# Patient Record
Sex: MALE | Race: WHITE | Employment: OTHER | URBAN - METROPOLITAN AREA
[De-identification: names, ages, dates, MRNs, and addresses within clinical notes are randomized per-mention and may not be internally consistent; named-entity substitution may affect disease eponyms.]

---

## 2019-12-02 ENCOUNTER — APPOINTMENT (OUTPATIENT)
Dept: CT IMAGING | Age: 64
DRG: 066 | End: 2019-12-02
Attending: EMERGENCY MEDICINE
Payer: COMMERCIAL

## 2019-12-02 ENCOUNTER — HOSPITAL ENCOUNTER (INPATIENT)
Age: 64
LOS: 4 days | Discharge: NURSING, REHAB OR LONG-TERM CARE | DRG: 066 | End: 2019-12-06
Attending: EMERGENCY MEDICINE | Admitting: HOSPITALIST
Payer: COMMERCIAL

## 2019-12-02 DIAGNOSIS — I61.9 LEFT-SIDED NONTRAUMATIC INTRACEREBRAL HEMORRHAGE, UNSPECIFIED CEREBRAL LOCATION (HCC): Primary | ICD-10-CM

## 2019-12-02 DIAGNOSIS — I61.0 NONTRAUMATIC SUBCORTICAL HEMORRHAGE OF LEFT CEREBRAL HEMISPHERE (HCC): ICD-10-CM

## 2019-12-02 DIAGNOSIS — I61.9 HEMORRHAGIC STROKE (HCC): ICD-10-CM

## 2019-12-02 LAB
ALBUMIN SERPL-MCNC: 3.8 G/DL (ref 3.5–5)
ALBUMIN/GLOB SERPL: 1.1 {RATIO} (ref 1.1–2.2)
ALP SERPL-CCNC: 84 U/L (ref 45–117)
ALT SERPL-CCNC: 27 U/L (ref 12–78)
ANION GAP SERPL CALC-SCNC: 6 MMOL/L (ref 5–15)
ANION GAP SERPL CALC-SCNC: 8 MMOL/L (ref 5–15)
APTT PPP: 26.6 SEC (ref 22.1–32)
AST SERPL-CCNC: 13 U/L (ref 15–37)
ATRIAL RATE: 80 BPM
BASOPHILS # BLD: 0.1 K/UL (ref 0–0.1)
BASOPHILS NFR BLD: 1 % (ref 0–1)
BILIRUB SERPL-MCNC: 0.3 MG/DL (ref 0.2–1)
BUN SERPL-MCNC: 22 MG/DL (ref 6–20)
BUN SERPL-MCNC: 22 MG/DL (ref 6–20)
BUN/CREAT SERPL: 19 (ref 12–20)
BUN/CREAT SERPL: 21 (ref 12–20)
CALCIUM SERPL-MCNC: 9 MG/DL (ref 8.5–10.1)
CALCIUM SERPL-MCNC: 9.1 MG/DL (ref 8.5–10.1)
CALCULATED P AXIS, ECG09: 57 DEGREES
CALCULATED R AXIS, ECG10: -29 DEGREES
CALCULATED T AXIS, ECG11: 37 DEGREES
CHLORIDE SERPL-SCNC: 101 MMOL/L (ref 97–108)
CHLORIDE SERPL-SCNC: 105 MMOL/L (ref 97–108)
CHOLEST SERPL-MCNC: 228 MG/DL
CO2 SERPL-SCNC: 25 MMOL/L (ref 21–32)
CO2 SERPL-SCNC: 26 MMOL/L (ref 21–32)
CREAT SERPL-MCNC: 1.05 MG/DL (ref 0.7–1.3)
CREAT SERPL-MCNC: 1.14 MG/DL (ref 0.7–1.3)
DIAGNOSIS, 93000: NORMAL
DIFFERENTIAL METHOD BLD: NORMAL
EOSINOPHIL # BLD: 0.1 K/UL (ref 0–0.4)
EOSINOPHIL NFR BLD: 2 % (ref 0–7)
ERYTHROCYTE [DISTWIDTH] IN BLOOD BY AUTOMATED COUNT: 11.9 % (ref 11.5–14.5)
EST. AVERAGE GLUCOSE BLD GHB EST-MCNC: 103 MG/DL
GLOBULIN SER CALC-MCNC: 3.4 G/DL (ref 2–4)
GLUCOSE BLD STRIP.AUTO-MCNC: 90 MG/DL (ref 65–100)
GLUCOSE SERPL-MCNC: 102 MG/DL (ref 65–100)
GLUCOSE SERPL-MCNC: 93 MG/DL (ref 65–100)
HBA1C MFR BLD: 5.2 % (ref 4–5.6)
HCT VFR BLD AUTO: 43.2 % (ref 36.6–50.3)
HDLC SERPL-MCNC: 58 MG/DL
HDLC SERPL: 3.9 {RATIO} (ref 0–5)
HGB BLD-MCNC: 15 G/DL (ref 12.1–17)
IMM GRANULOCYTES # BLD AUTO: 0 K/UL (ref 0–0.04)
IMM GRANULOCYTES NFR BLD AUTO: 0 % (ref 0–0.5)
INR PPP: 1 (ref 0.9–1.1)
LDLC SERPL CALC-MCNC: 109.2 MG/DL (ref 0–100)
LIPID PROFILE,FLP: ABNORMAL
LYMPHOCYTES # BLD: 2.8 K/UL (ref 0.8–3.5)
LYMPHOCYTES NFR BLD: 38 % (ref 12–49)
MCH RBC QN AUTO: 33.7 PG (ref 26–34)
MCHC RBC AUTO-ENTMCNC: 34.7 G/DL (ref 30–36.5)
MCV RBC AUTO: 97.1 FL (ref 80–99)
MONOCYTES # BLD: 0.7 K/UL (ref 0–1)
MONOCYTES NFR BLD: 9 % (ref 5–13)
NEUTS SEG # BLD: 3.6 K/UL (ref 1.8–8)
NEUTS SEG NFR BLD: 50 % (ref 32–75)
NRBC # BLD: 0 K/UL (ref 0–0.01)
NRBC BLD-RTO: 0 PER 100 WBC
P-R INTERVAL, ECG05: 160 MS
PLATELET # BLD AUTO: 219 K/UL (ref 150–400)
PMV BLD AUTO: 9.1 FL (ref 8.9–12.9)
POTASSIUM SERPL-SCNC: 4.3 MMOL/L (ref 3.5–5.1)
POTASSIUM SERPL-SCNC: 4.5 MMOL/L (ref 3.5–5.1)
PROT SERPL-MCNC: 7.2 G/DL (ref 6.4–8.2)
PROTHROMBIN TIME: 9.8 SEC (ref 9–11.1)
Q-T INTERVAL, ECG07: 380 MS
QRS DURATION, ECG06: 90 MS
QTC CALCULATION (BEZET), ECG08: 438 MS
RBC # BLD AUTO: 4.45 M/UL (ref 4.1–5.7)
SERVICE CMNT-IMP: NORMAL
SODIUM SERPL-SCNC: 135 MMOL/L (ref 136–145)
SODIUM SERPL-SCNC: 136 MMOL/L (ref 136–145)
THERAPEUTIC RANGE,PTTT: NORMAL SECS (ref 58–77)
TRIGL SERPL-MCNC: 304 MG/DL (ref ?–150)
VENTRICULAR RATE, ECG03: 80 BPM
VLDLC SERPL CALC-MCNC: 60.8 MG/DL
WBC # BLD AUTO: 7.3 K/UL (ref 4.1–11.1)

## 2019-12-02 PROCEDURE — 97161 PT EVAL LOW COMPLEX 20 MIN: CPT

## 2019-12-02 PROCEDURE — 97165 OT EVAL LOW COMPLEX 30 MIN: CPT

## 2019-12-02 PROCEDURE — 99285 EMERGENCY DEPT VISIT HI MDM: CPT

## 2019-12-02 PROCEDURE — 85610 PROTHROMBIN TIME: CPT

## 2019-12-02 PROCEDURE — 97535 SELF CARE MNGMENT TRAINING: CPT

## 2019-12-02 PROCEDURE — 36415 COLL VENOUS BLD VENIPUNCTURE: CPT

## 2019-12-02 PROCEDURE — 85730 THROMBOPLASTIN TIME PARTIAL: CPT

## 2019-12-02 PROCEDURE — 74011000250 HC RX REV CODE- 250: Performed by: HOSPITALIST

## 2019-12-02 PROCEDURE — 74011250637 HC RX REV CODE- 250/637: Performed by: NURSE PRACTITIONER

## 2019-12-02 PROCEDURE — 82962 GLUCOSE BLOOD TEST: CPT

## 2019-12-02 PROCEDURE — 97116 GAIT TRAINING THERAPY: CPT

## 2019-12-02 PROCEDURE — 80053 COMPREHEN METABOLIC PANEL: CPT

## 2019-12-02 PROCEDURE — 65660000000 HC RM CCU STEPDOWN

## 2019-12-02 PROCEDURE — 85025 COMPLETE CBC W/AUTO DIFF WBC: CPT

## 2019-12-02 PROCEDURE — 97530 THERAPEUTIC ACTIVITIES: CPT

## 2019-12-02 PROCEDURE — 80061 LIPID PANEL: CPT

## 2019-12-02 PROCEDURE — 74011250637 HC RX REV CODE- 250/637: Performed by: HOSPITALIST

## 2019-12-02 PROCEDURE — 93005 ELECTROCARDIOGRAM TRACING: CPT

## 2019-12-02 PROCEDURE — 70450 CT HEAD/BRAIN W/O DYE: CPT

## 2019-12-02 PROCEDURE — 83036 HEMOGLOBIN GLYCOSYLATED A1C: CPT

## 2019-12-02 RX ORDER — LOSARTAN POTASSIUM 50 MG/1
100 TABLET ORAL DAILY
Status: DISCONTINUED | OUTPATIENT
Start: 2019-12-03 | End: 2019-12-07 | Stop reason: HOSPADM

## 2019-12-02 RX ORDER — LOSARTAN POTASSIUM 100 MG/1
100 TABLET ORAL DAILY
COMMUNITY

## 2019-12-02 RX ORDER — SERTRALINE HYDROCHLORIDE 50 MG/1
50 TABLET, FILM COATED ORAL DAILY
COMMUNITY

## 2019-12-02 RX ORDER — ONDANSETRON 2 MG/ML
4 INJECTION INTRAMUSCULAR; INTRAVENOUS
Status: DISCONTINUED | OUTPATIENT
Start: 2019-12-02 | End: 2019-12-07 | Stop reason: HOSPADM

## 2019-12-02 RX ORDER — FAMOTIDINE 20 MG/1
20 TABLET, FILM COATED ORAL 2 TIMES DAILY
Status: DISCONTINUED | OUTPATIENT
Start: 2019-12-02 | End: 2019-12-02

## 2019-12-02 RX ORDER — DEXTROAMPHETAMINE SACCHARATE, AMPHETAMINE ASPARTATE MONOHYDRATE, DEXTROAMPHETAMINE SULFATE AND AMPHETAMINE SULFATE 5; 5; 5; 5 MG/1; MG/1; MG/1; MG/1
20 CAPSULE, EXTENDED RELEASE ORAL 2 TIMES DAILY
COMMUNITY
End: 2021-06-30 | Stop reason: ALTCHOICE

## 2019-12-02 RX ORDER — LOSARTAN POTASSIUM 50 MG/1
50 TABLET ORAL DAILY
Status: DISCONTINUED | OUTPATIENT
Start: 2019-12-02 | End: 2019-12-02

## 2019-12-02 RX ORDER — ACETAMINOPHEN 650 MG/1
650 SUPPOSITORY RECTAL
Status: DISCONTINUED | OUTPATIENT
Start: 2019-12-02 | End: 2019-12-07 | Stop reason: HOSPADM

## 2019-12-02 RX ORDER — LOSARTAN POTASSIUM 50 MG/1
TABLET ORAL DAILY
Status: ON HOLD | COMMUNITY
End: 2019-12-02 | Stop reason: DRUGHIGH

## 2019-12-02 RX ORDER — MELOXICAM 15 MG/1
15 TABLET ORAL DAILY
COMMUNITY
End: 2019-12-06

## 2019-12-02 RX ORDER — HYDRALAZINE HYDROCHLORIDE 20 MG/ML
10 INJECTION INTRAMUSCULAR; INTRAVENOUS
Status: DISCONTINUED | OUTPATIENT
Start: 2019-12-02 | End: 2019-12-07 | Stop reason: HOSPADM

## 2019-12-02 RX ORDER — LAMOTRIGINE 150 MG/1
150 TABLET ORAL 2 TIMES DAILY
COMMUNITY

## 2019-12-02 RX ORDER — LABETALOL HYDROCHLORIDE 5 MG/ML
10 INJECTION, SOLUTION INTRAVENOUS
Status: DISCONTINUED | OUTPATIENT
Start: 2019-12-02 | End: 2019-12-07 | Stop reason: HOSPADM

## 2019-12-02 RX ORDER — ACETAMINOPHEN 325 MG/1
650 TABLET ORAL
Status: DISCONTINUED | OUTPATIENT
Start: 2019-12-02 | End: 2019-12-07 | Stop reason: HOSPADM

## 2019-12-02 RX ORDER — BISOPROLOL FUMARATE AND HYDROCHLOROTHIAZIDE 10; 6.25 MG/1; MG/1
1 TABLET ORAL DAILY
Status: DISCONTINUED | OUTPATIENT
Start: 2019-12-02 | End: 2019-12-02

## 2019-12-02 RX ORDER — LITHIUM CARBONATE 600 MG/1
600 CAPSULE ORAL
COMMUNITY
End: 2021-06-30 | Stop reason: ALTCHOICE

## 2019-12-02 RX ORDER — LITHIUM CARBONATE 300 MG/1
600 CAPSULE ORAL
Status: DISCONTINUED | OUTPATIENT
Start: 2019-12-02 | End: 2019-12-07 | Stop reason: HOSPADM

## 2019-12-02 RX ORDER — SERTRALINE HYDROCHLORIDE 50 MG/1
50 TABLET, FILM COATED ORAL DAILY
Status: DISCONTINUED | OUTPATIENT
Start: 2019-12-03 | End: 2019-12-07 | Stop reason: HOSPADM

## 2019-12-02 RX ORDER — NALOXONE HYDROCHLORIDE 0.4 MG/ML
0.4 INJECTION, SOLUTION INTRAMUSCULAR; INTRAVENOUS; SUBCUTANEOUS AS NEEDED
Status: DISCONTINUED | OUTPATIENT
Start: 2019-12-02 | End: 2019-12-07 | Stop reason: HOSPADM

## 2019-12-02 RX ADMIN — LOSARTAN POTASSIUM 50 MG: 50 TABLET, FILM COATED ORAL at 09:03

## 2019-12-02 RX ADMIN — BISOPROLOL FUMARATE AND HYDROCHLOROTHIAZIDE 1 TABLET: 6.25; 1 TABLET ORAL at 09:03

## 2019-12-02 RX ADMIN — LAMOTRIGINE 150 MG: 100 TABLET ORAL at 18:03

## 2019-12-02 RX ADMIN — LITHIUM CARBONATE 600 MG: 300 CAPSULE, GELATIN COATED ORAL at 22:14

## 2019-12-02 RX ADMIN — LAMOTRIGINE 150 MG: 100 TABLET ORAL at 09:04

## 2019-12-02 RX ADMIN — LABETALOL HYDROCHLORIDE 10 MG: 5 INJECTION INTRAVENOUS at 06:00

## 2019-12-02 NOTE — ED TRIAGE NOTES
Patient arriving with c/o being unable to bear weight on RIGHT leg, including RIGHT hand tingling, feeling weird. Reports symptoms started 1 hour PTA.

## 2019-12-02 NOTE — H&P
HISTORY AND PHYSICAL      PCP: Darlene Bravo MD  History source: the patient, EMR      CC: right leg weakness      HPI: 75 y.o man w/ HTN who presented to the [place] ED with acute-onset right leg weakness. Symptoms began around 1 AM when he felt his right leg was heavy and he could not bear weight on it. Associated symptoms include right hand paresthesias. No headache, vision changes, n/v. No similar symptoms in the past. He reports compliance with his antihypertensives. He denies taking aspirin or anticoagulants. 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 No pertinent surgical history. Home meds:   Prior to Admission medications    Medication Sig Start Date End Date Taking? Authorizing Provider   losartan (COZAAR) 50 mg tablet Take  by mouth daily. Yes Other, MD Michelle   lamoTRIgine (LAMICTAL) 200 mg tablet Take 150 mg by mouth two (2) times a day. Yes Provider, Historical   dextroamphetamine-amphetamine (ADDERALL) 20 mg tablet Take 20 mg by mouth. Yes Provider, Historical   bisoprolol-hydrochlorothiazide (ZIAC) 10-6.25 mg per tablet Take 1 Tab by mouth daily. Yes Provider, Historical   tadalafil (CIALIS) 5 mg tablet Take 5 mg by mouth as needed. Provider, Historical   Venlafaxine 75 mg tr24 Take  by mouth.     Provider, Historical       Allergies:  No Known Allergies    FH:  Family History   Problem Relation Age of Onset    Heart Disease Brother        SH:  Social History     Tobacco Use    Smoking status: Former Smoker     Packs/day: 1.00     Years: 4.00     Pack years: 4.00     Last attempt to quit: 1974     Years since quittin.4   Substance Use Topics    Alcohol use: Yes     Comment: occasional       ROS: A comprehensive review of systems was negative except for that written in the HPI.      PHYSICAL EXAM:  Visit Vitals  /90   Pulse 85   Temp 97.8 °F (36.6 °C)   Resp 19   Wt 98.9 kg (218 lb 0.6 oz)   SpO2 93%   BMI 33.15 kg/m²       Gen: NAD, non-toxic  HEENT: anicteric sclerae, normal conjunctiva, oropharynx clear, MM moist  Neck: supple, trachea midline, no adenopathy  Heart: RRR, no MRG, no JVD, no peripheral edema  Lungs: CTA b/l, non-labored respirations  Abd: soft, NT, ND, BS+, no organomegaly  Extr: warm  Skin: dry, no rash  Neuro: CN II-XII grossly intact, normal speech, 4+/5 strength with right elbow flexion/extension, and right knee and hip flexion  Psych: normal mood, appropriate affect, a&ox3      Labs/Imaging:  Recent Results (from the past 24 hour(s))   GLUCOSE, POC    Collection Time: 12/02/19  2:33 AM   Result Value Ref Range    Glucose (POC) 90 65 - 100 mg/dL    Performed by Gisela Lara, COMPREHENSIVE    Collection Time: 12/02/19  2:51 AM   Result Value Ref Range    Sodium 135 (L) 136 - 145 mmol/L    Potassium 4.3 3.5 - 5.1 mmol/L    Chloride 101 97 - 108 mmol/L    CO2 26 21 - 32 mmol/L    Anion gap 8 5 - 15 mmol/L    Glucose 93 65 - 100 mg/dL    BUN 22 (H) 6 - 20 MG/DL    Creatinine 1.14 0.70 - 1.30 MG/DL    BUN/Creatinine ratio 19 12 - 20      GFR est AA >60 >60 ml/min/1.73m2    GFR est non-AA >60 >60 ml/min/1.73m2    Calcium 9.1 8.5 - 10.1 MG/DL    Bilirubin, total 0.3 0.2 - 1.0 MG/DL    ALT (SGPT) 27 12 - 78 U/L    AST (SGOT) 13 (L) 15 - 37 U/L    Alk.  phosphatase 84 45 - 117 U/L    Protein, total 7.2 6.4 - 8.2 g/dL    Albumin 3.8 3.5 - 5.0 g/dL    Globulin 3.4 2.0 - 4.0 g/dL    A-G Ratio 1.1 1.1 - 2.2     CBC WITH AUTOMATED DIFF    Collection Time: 12/02/19  2:51 AM   Result Value Ref Range    WBC 7.3 4.1 - 11.1 K/uL    RBC 4.45 4.10 - 5.70 M/uL    HGB 15.0 12.1 - 17.0 g/dL    HCT 43.2 36.6 - 50.3 %    MCV 97.1 80.0 - 99.0 FL    MCH 33.7 26.0 - 34.0 PG    MCHC 34.7 30.0 - 36.5 g/dL    RDW 11.9 11.5 - 14.5 %    PLATELET 395 721 - 856 K/uL MPV 9.1 8.9 - 12.9 FL    NRBC 0.0 0  WBC    ABSOLUTE NRBC 0.00 0.00 - 0.01 K/uL    NEUTROPHILS 50 32 - 75 %    LYMPHOCYTES 38 12 - 49 %    MONOCYTES 9 5 - 13 %    EOSINOPHILS 2 0 - 7 %    BASOPHILS 1 0 - 1 %    IMMATURE GRANULOCYTES 0 0.0 - 0.5 %    ABS. NEUTROPHILS 3.6 1.8 - 8.0 K/UL    ABS. LYMPHOCYTES 2.8 0.8 - 3.5 K/UL    ABS. MONOCYTES 0.7 0.0 - 1.0 K/UL    ABS. EOSINOPHILS 0.1 0.0 - 0.4 K/UL    ABS. BASOPHILS 0.1 0.0 - 0.1 K/UL    ABS. IMM. GRANS. 0.0 0.00 - 0.04 K/UL    DF AUTOMATED     EKG, 12 LEAD, INITIAL    Collection Time: 12/02/19  3:02 AM   Result Value Ref Range    Ventricular Rate 80 BPM    Atrial Rate 80 BPM    P-R Interval 160 ms    QRS Duration 90 ms    Q-T Interval 380 ms    QTC Calculation (Bezet) 438 ms    Calculated P Axis 57 degrees    Calculated R Axis -29 degrees    Calculated T Axis 37 degrees    Diagnosis       Normal sinus rhythm  Moderate voltage criteria for LVH, may be normal variant  Borderline ECG  When compared with ECG of 10-SEP-1998 09:48,  Sinus rhythm has replaced Atrial fibrillation  Nonspecific T wave abnormality no longer evident in Inferior leads  QT has lengthened         Recent Labs     12/02/19  0251   WBC 7.3   HGB 15.0   HCT 43.2        Recent Labs     12/02/19  0251   *   K 4.3      CO2 26   BUN 22*   CREA 1.14   GLU 93   CA 9.1     Recent Labs     12/02/19  0251   SGOT 13*   ALT 27   AP 84   TBILI 0.3   TP 7.2   ALB 3.8   GLOB 3.4       No results for input(s): CPK, CKNDX, TROIQ in the last 72 hours. No lab exists for component: CPKMB    No results for input(s): INR, PTP, APTT, INREXT in the last 72 hours. No results for input(s): PH, PCO2, PO2 in the last 72 hours. Ct Code Neuro Head Wo Contrast    Result Date: 12/2/2019  IMPRESSION: Acute parenchymal hemorrhage in the left basal ganglia/left corona radiata, likely hypertensive in etiology.  Periventricular white matter disease is nonspecific but likely represents chronic small vessel ischemia. The findings were called to Dr. Flaca Whiting on December 2, 2019 at 2:49 AM by Dr. Janessa Munguia.  [id number]           Assessment & Plan:     Acute left basal ganglia hemorrhagic stroke: (POA)   -BP control  -neurochecks  -check coags  -PT/OT eval  -neurology consultation    HTN:  -resume home meds and titrate as indicated  -PRN IV labetalol and hydralazine    Patient confirmed taking Lamictal but is unsure of the indication    Labs pending: PT/INR, aPTT, lipid panel, Hgb a1c    DVT ppx: SCDs  Code status: full  Disposition: monitor in ICU, can likely downgrade soon if stable, d/c planning pending therapy evals    Signed By: Richie Arriola MD     December 2, 2019

## 2019-12-02 NOTE — PROGRESS NOTES
General Daily Progress Note    Admit Date: 12/2/2019  Hospital day 1    Subjective:     Patient presented with c/o right leg \"not working\" as well as tingling in his right upper and lower extremities. CT was done which showed a small parenchymal hemorrhage in his left basal ganglia, being managed medically. He feels that the tingling has improved in his right lower extremity but continues in his right hand. He is also c/o of discomfort in his left Achilles tendon area / left posterior ankle. Medication side effects: none    Current Facility-Administered Medications   Medication Dose Route Frequency    ondansetron (ZOFRAN) injection 4 mg  4 mg IntraVENous Q6H PRN    labetalol (NORMODYNE;TRANDATE) injection 10 mg  10 mg IntraVENous Q15MIN PRN    Or    hydrALAZINE (APRESOLINE) 20 mg/mL injection 10 mg  10 mg IntraVENous Q15MIN PRN    naloxone (NARCAN) injection 0.4 mg  0.4 mg IntraVENous PRN    acetaminophen (TYLENOL) tablet 650 mg  650 mg Oral Q4H PRN    Or    acetaminophen (TYLENOL) solution 650 mg  650 mg Per NG tube Q4H PRN    Or    acetaminophen (TYLENOL) suppository 650 mg  650 mg Rectal Q4H PRN    acetaminophen (TYLENOL) tablet 650 mg  650 mg Oral Q6H PRN    bisoprolol-hydroCHLOROthiazide (ZIAC) 10-6.25 mg per tablet 1 Tab  1 Tab Oral DAILY    lamoTRIgine (LaMICtal) tablet 150 mg  150 mg Oral BID    losartan (COZAAR) tablet 50 mg  50 mg Oral DAILY        Review of Systems  Gen:  NAD. Heart: Denies chest pain, palpitations or edema. Lungs: Denies SOB, BEASLEY or wheezing. GI: Denies nausea, vomiting, diarrhea or constipation. :  Denies dysuria. Skin: Denies rash  Neuro:  Denies headache. Wife reports h/o white matter disease diagnosed one year ago and followed by Dr. Paula Lewis on [facility]. His c/o of tingling in his right upper and lower extremities has improved; tingling in the right hand remains.   When he presented, he felt that his right leg could not support his weight (had to hop out to the car to come in). Will be working with PT.         Objective:     Patient Vitals for the past 8 hrs:   BP Temp Pulse Resp SpO2 Height Weight   12/02/19 1000 110/73  76 19 95 %     12/02/19 0903 119/78  78       12/02/19 0900 119/79  76 18 96 %     12/02/19 0800 127/73 97.4 °F (36.3 °C) 68 17 93 %     12/02/19 0700 114/64  70 17 93 %     12/02/19 0630 122/77  71 17 93 %     12/02/19 0600 (!) 164/91  83 20 95 %     12/02/19 0515 130/88  77 21 93 %     12/02/19 0500 153/79  92 22 97 %     12/02/19 0417 147/86 98.4 °F (36.9 °C) 83 14 95 % 5' 7\" (1.702 m) 91.4 kg (201 lb 8 oz)   12/02/19 0330 138/90  85 19 93 %     12/02/19 0315 (!) 147/96 97.8 °F (36.6 °C) 80 19 90 %       No intake/output data recorded. 11/30 1901 - 12/02 0700  In: -   Out: 350 [Urine:350]    Physical Exam:  Gen:  NAD. Heart: SR without ectopy. No edema. Lungs: Clear without wheezes or rhonchi. GI: Eating breakfast and tolerating it well without dysphagia. :  No mills. Skin: No rash. Neuro: Answers questions appropriately, alert and oriented. Right  slightly weaker than left . Sensation intact on upper and lower extremities. Motor strength appears equal with dorsiflexion and plantar flexion of his feet. He is able to hold both legs off the bed, one at a time. Minor drift of right leg but barely noticeable.         ECG: SR    Data Review   Recent Results (from the past 24 hour(s))   GLUCOSE, POC    Collection Time: 12/02/19  2:33 AM   Result Value Ref Range    Glucose (POC) 90 65 - 100 mg/dL    Performed by Gisela Lara, COMPREHENSIVE    Collection Time: 12/02/19  2:51 AM   Result Value Ref Range    Sodium 135 (L) 136 - 145 mmol/L    Potassium 4.3 3.5 - 5.1 mmol/L    Chloride 101 97 - 108 mmol/L    CO2 26 21 - 32 mmol/L    Anion gap 8 5 - 15 mmol/L    Glucose 93 65 - 100 mg/dL    BUN 22 (H) 6 - 20 MG/DL    Creatinine 1.14 0.70 - 1.30 MG/DL    BUN/Creatinine ratio 19 12 - 20      GFR est AA >60 >60 ml/min/1.73m2    GFR est non-AA >60 >60 ml/min/1.73m2    Calcium 9.1 8.5 - 10.1 MG/DL    Bilirubin, total 0.3 0.2 - 1.0 MG/DL    ALT (SGPT) 27 12 - 78 U/L    AST (SGOT) 13 (L) 15 - 37 U/L    Alk. phosphatase 84 45 - 117 U/L    Protein, total 7.2 6.4 - 8.2 g/dL    Albumin 3.8 3.5 - 5.0 g/dL    Globulin 3.4 2.0 - 4.0 g/dL    A-G Ratio 1.1 1.1 - 2.2     CBC WITH AUTOMATED DIFF    Collection Time: 12/02/19  2:51 AM   Result Value Ref Range    WBC 7.3 4.1 - 11.1 K/uL    RBC 4.45 4.10 - 5.70 M/uL    HGB 15.0 12.1 - 17.0 g/dL    HCT 43.2 36.6 - 50.3 %    MCV 97.1 80.0 - 99.0 FL    MCH 33.7 26.0 - 34.0 PG    MCHC 34.7 30.0 - 36.5 g/dL    RDW 11.9 11.5 - 14.5 %    PLATELET 084 206 - 235 K/uL    MPV 9.1 8.9 - 12.9 FL    NRBC 0.0 0  WBC    ABSOLUTE NRBC 0.00 0.00 - 0.01 K/uL    NEUTROPHILS 50 32 - 75 %    LYMPHOCYTES 38 12 - 49 %    MONOCYTES 9 5 - 13 %    EOSINOPHILS 2 0 - 7 %    BASOPHILS 1 0 - 1 %    IMMATURE GRANULOCYTES 0 0.0 - 0.5 %    ABS. NEUTROPHILS 3.6 1.8 - 8.0 K/UL    ABS. LYMPHOCYTES 2.8 0.8 - 3.5 K/UL    ABS. MONOCYTES 0.7 0.0 - 1.0 K/UL    ABS. EOSINOPHILS 0.1 0.0 - 0.4 K/UL    ABS. BASOPHILS 0.1 0.0 - 0.1 K/UL    ABS. IMM.  GRANS. 0.0 0.00 - 0.04 K/UL    DF AUTOMATED     EKG, 12 LEAD, INITIAL    Collection Time: 12/02/19  3:02 AM   Result Value Ref Range    Ventricular Rate 80 BPM    Atrial Rate 80 BPM    P-R Interval 160 ms    QRS Duration 90 ms    Q-T Interval 380 ms    QTC Calculation (Bezet) 438 ms    Calculated P Axis 57 degrees    Calculated R Axis -29 degrees    Calculated T Axis 37 degrees    Diagnosis       Normal sinus rhythm  Moderate voltage criteria for LVH, may be normal variant  When compared with ECG of 10-SEP-1998 09:48,  Sinus rhythm has replaced Atrial fibrillation  Nonspecific T wave abnormality no longer evident in Inferior leads    Confirmed by Rani Pineda M.D., Antonio Carpenter ([id number]) on 12/2/2019 7:60:95 AM     METABOLIC PANEL, BASIC    Collection Time: 12/02/19  4:43 AM   Result Value Ref Range    Sodium 136 136 - 145 mmol/L    Potassium 4.5 3.5 - 5.1 mmol/L    Chloride 105 97 - 108 mmol/L    CO2 25 21 - 32 mmol/L    Anion gap 6 5 - 15 mmol/L    Glucose 102 (H) 65 - 100 mg/dL    BUN 22 (H) 6 - 20 MG/DL    Creatinine 1.05 0.70 - 1.30 MG/DL    BUN/Creatinine ratio 21 (H) 12 - 20      GFR est AA >60 >60 ml/min/1.73m2    GFR est non-AA >60 >60 ml/min/1.73m2    Calcium 9.0 8.5 - 10.1 MG/DL   LIPID PANEL    Collection Time: 12/02/19  4:43 AM   Result Value Ref Range    LIPID PROFILE          Cholesterol, total 228 (H) <200 MG/DL    Triglyceride 304 (H) <150 MG/DL    HDL Cholesterol 58 MG/DL    LDL, calculated 109.2 (H) 0 - 100 MG/DL    VLDL, calculated 60.8 MG/DL    CHOL/HDL Ratio 3.9 0.0 - 5.0     HEMOGLOBIN A1C WITH EAG    Collection Time: 12/02/19  4:43 AM   Result Value Ref Range    Hemoglobin A1c 5.2 4.0 - 5.6 %    Est. average glucose 103 mg/dL   PROTHROMBIN TIME + INR    Collection Time: 12/02/19  4:43 AM   Result Value Ref Range    INR 1.0 0.9 - 1.1      Prothrombin time 9.8 9.0 - 11.1 sec   PTT    Collection Time: 12/02/19  4:43 AM   Result Value Ref Range    aPTT 26.6 22.1 - 32.0 sec    aPTT, therapeutic range     58.0 - 77.0 SECS           Assessment and Plan: Active Problems:    Hemorrhagic stroke ([facility] [address] (12/2/2019)    Acute left basal ganglia hemorrhagic stroke (POA)  -BP control with systolic goal <164  -q 1 hr neuro checks or per neuro team  -PT/OT  -Medical management; surgical intervention not required at this time. HTN  -taking home meds without difficulty. BP well controlled.     -PRN IV labetalol and hydralazine    Discomfort left Achilles  -reports that he has been wearing new boots recently  -float heels  -continue to monitor     DVT ppx: SCDs  Code status: full  Disposition: monitor in ICU, can likely downgrade soon, d/c planning pending     CC time:  30 minutes

## 2019-12-02 NOTE — ED NOTES
2:30 AM Code S Level 1 called. 3:10 AM NIH 1: Patient with drift to RLE, and 'feels like my fingertips are wider on my right hand\". 3:15 AM Dysphagia passed without issue. 3:19 AM TRANSFER - OUT REPORT:    Verbal report given to Meagan Price RN (name) on [facility]  being transferred to ICU (unit) for routine progression of care       Report consisted of patients Situation, Background, Assessment and   Recommendations(SBAR). Information from the following report(s) SBAR, ED Summary, Intake/Output, Recent Results and Cardiac Rhythm NSR was reviewed with the receiving nurse. Lines:   Peripheral IV 12/02/19 Right Hand (Active)   Site Assessment Clean, dry, & intact 12/2/2019  3:03 AM   Phlebitis Assessment 0 12/2/2019  3:03 AM   Infiltration Assessment 0 12/2/2019  3:03 AM   Dressing Status Clean, dry, & intact 12/2/2019  3:03 AM   Dressing Type Transparent 12/2/2019  3:03 AM   Hub Color/Line Status Green;Patent; Flushed 12/2/2019  3:03 AM   Action Taken Blood drawn 12/2/2019  3:03 AM   Alcohol Cap Used Yes 12/2/2019  3:03 AM       Peripheral IV 12/02/19 Right Antecubital (Active)   Site Assessment Clean, dry, & intact 12/2/2019  2:49 AM   Phlebitis Assessment 0 12/2/2019  2:49 AM   Infiltration Assessment 0 12/2/2019  2:49 AM   Dressing Status Clean, dry, & intact 12/2/2019  2:49 AM   Dressing Type Transparent 12/2/2019  2:49 AM   Hub Color/Line Status Green;Patent; Flushed 12/2/2019  2:49 AM   Action Taken Blood drawn 12/2/2019  2:49 AM   Alcohol Cap Used Yes 12/2/2019  2:49 AM        Opportunity for questions and clarification was provided. Patient transported with:   Monitor  Registered Nurse  Tech    3:22 AM CCT in department to transport patient to [facility] for admission. 3:40 AM CCT leaves department, patient's assessment remains unchanged, VSS.

## 2019-12-02 NOTE — PROGRESS NOTES
Bedside and Verbal shift change report given to Herminia Christina (oncoming nurse) by Ambrose Broussard (offgoing nurse). Report included the following information SBAR, Kardex, Intake/Output, MAR, Accordion, Recent Results, Med Rec Status, Cardiac Rhythm nsr, Alarm Parameters  and Dual Neuro Assessment. 0800: Pt oriented x4, eyes open to voice/spontaneously, drowsy, afebrile, clear speech, no facial droop, sensation minimally decreased RUE and RLE. Pt with tingling in RUE and RLE, states it feels better this AM compared to when he came in around 2am. Family at bedside, will continue monitoring closely  0915: Intensivist NP at bedside speaking with patient and family  [phone]: OT/PT at bedside  1020: Dr Jessie Whitehead at bedside, no new orders received, MD note recommends holding antiplatelets ~1 mo  2464: Dr Jourdan Cheema at bedside, no plans for surgery, orders for Q2 neuro checks received  1408: Interdisciplinary rounds, med rec needed and will be done by pharmacy  ICU multidisciplinary rounds lead by Kay Umana NP (Intensivist): The following were reviewed and discussed: current labs,  recent imaging, lines/drains, review of body systems, nutrition, cultures, mobility, length of ICU stay. The plan of care for the day is as follows, medical management continue to monitor, transfer orders to [facility] received, NP to enter(written note by RN)   [phone]: Spoke with Dr Jsesie Whitehead in regards to note regarding [address] in AM, orders received for head CT w/o contrast for 0400 12/3  1908: Spoke with pts wife Sierra Schwartz regarding pt moving to [facility]    TRANSFER - OUT REPORT:  Verbal report given to Eunice(name) on Laura Eng  being transferred to [facility](unit) for routine progression of care     Report consisted of patients Situation, Background, Assessment and   Recommendations(SBAR).    Information from the following report(s) SBAR, Kardex, Intake/Output, MAR, Accordion, Recent Results, Med Rec Status, Cardiac Rhythm nsr, Alarm Parameters  and Dual Neuro Assessment was reviewed with the receiving nurse. Lines:   Peripheral IV 12/02/19 Right Hand (Active)   Site Assessment Clean, dry, & intact 12/2/2019  4:00 PM   Phlebitis Assessment 0 12/2/2019  4:00 PM   Infiltration Assessment 0 12/2/2019  4:00 PM   Dressing Status Clean, dry, & intact 12/2/2019  4:00 PM   Dressing Type Transparent;Tape 12/2/2019  4:00 PM   Hub Color/Line Status Green;Capped 12/2/2019  4:00 PM   Action Taken Open ports on tubing capped 12/2/2019  4:00 PM   Alcohol Cap Used Yes 12/2/2019  4:00 PM       Peripheral IV 12/02/19 Right Antecubital (Active)   Site Assessment Clean, dry, & intact 12/2/2019  4:00 PM   Phlebitis Assessment 0 12/2/2019  4:00 PM   Infiltration Assessment 0 12/2/2019  4:00 PM   Dressing Status Clean, dry, & intact 12/2/2019  4:00 PM   Dressing Type Transparent;Tape 12/2/2019  4:00 PM   Hub Color/Line Status Green;Capped 12/2/2019  4:00 PM   Action Taken Open ports on tubing capped 12/2/2019  4:00 PM   Alcohol Cap Used Yes 12/2/2019  4:00 PM      Opportunity for questions and clarification was provided.     Patient transported with:   Monitor  Tech

## 2019-12-02 NOTE — PROGRESS NOTES
Physical therapy:     Orders received, chart reviewed and patient evaluated by physical therapy. Pending progression with skilled acute physical therapy, recommend: To be determined: short stay at [facility] vs [facility] pending progress.       Recommend with nursing patient to complete as able in order to maintain strength, endurance and independence: OOB to chair 3x/day with 1 assist and functional mobility to the bathroom with 1 assist. Thank you for your assistance.      Full evaluation to follow.      Sarah Viveros, PT, DPT

## 2019-12-02 NOTE — PROGRESS NOTES
Orders received, chart reviewed and patient evaluated by occupational therapy. Pending progression with skilled acute occupational therapy, recommend: To be determined: [facility] for short rehab stay vs [facility] pending progress. Recommend with nursing patient to complete as able in order to maintain strength, endurance and independence: OOB to chair 3x/day with 1 assist and functional mobility to the bathroom with 1 assist. Thank you for your assistance. Full evaluation to follow.

## 2019-12-02 NOTE — ED PROVIDER NOTES
History of hypertension, ADD, \"advanced white matter disease\"; he presents accompanied by his wife with complaints of right lower extremity weakness and right hand numbness. He states that he was in the bathroom about 1 AM today (approximately 90 minutes ago) when he noted right leg weakness upon getting up off the commode. He states since that time his right leg has not wanted to support his weight. He also reports right hand numbness where his fingers feel fat. He denies a history of similar symptoms. His wife reports that he has been diagnosed with advanced white matter disease. She states that over the last few years he has had cognitive difficulty and trouble with his words at times. He has also had balance issues. 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 No pertinent surgical history.       Family History:   Problem Relation Age of Onset    Heart Disease Brother        Social History     Socioeconomic History    Marital status:      Spouse name: Not on file    Number of children: Not on file    Years of education: Not on file    Highest education level: Not on file   Occupational History    Not on file   Social Needs    Financial resource strain: Not on file    Food insecurity:     Worry: Not on file     Inability: Not on file    Transportation needs:     Medical: Not on file     Non-medical: Not on file   Tobacco Use    Smoking status: Former Smoker     Packs/day: 1.00     Years: 4.00     Pack years: 4.00     Last attempt to quit: 1974     Years since quittin.4   Substance and Sexual Activity    Alcohol use: Yes     Comment: occasional    Drug use: No    Sexual activity: Not on file   Lifestyle    Physical activity:     Days per week: Not on file     Minutes per session: Not on file    Stress: Not on file   Relationships    Social connections:     Talks on phone: Not on file     Gets together: Not on file     Attends Advent service: Not on file     Active member of club or organization: Not on file     Attends meetings of clubs or organizations: Not on file     Relationship status: Not on file    Intimate partner violence:     Fear of current or ex partner: Not on file     Emotionally abused: Not on file     Physically abused: Not on file     Forced sexual activity: Not on file   Other Topics Concern    Not on file   Social History Narrative    Not on file         ALLERGIES: Patient has no allergy information on record. Review of Systems   All other systems reviewed and are negative. Vitals:    12/02/19 0230   BP: (!) 144/94   Pulse: 83   Resp: 18   SpO2: 94%            Physical Exam  Vitals signs and nursing note reviewed. Constitutional:       Appearance: He is well-developed. HENT:      Head: Normocephalic and atraumatic. Eyes:      Conjunctiva/sclera: Conjunctivae normal.   Neck:      Musculoskeletal: Neck supple. Trachea: No tracheal deviation. Cardiovascular:      Rate and Rhythm: Normal rate and regular rhythm. Heart sounds: Normal heart sounds. No murmur. No friction rub. No gallop. Pulmonary:      Effort: Pulmonary effort is normal.      Breath sounds: Normal breath sounds. Abdominal:      Palpations: Abdomen is soft. Tenderness: There is no tenderness. Musculoskeletal:         General: No deformity. Skin:     General: Skin is warm and dry. Neurological:      Mental Status: He is alert. Comments: Oriented; normal upper extremity strength and sensation to light touch; normal lower extremities sensation to light touch; he has more difficulty raising his right lower extremity up against gravity compared to his left. Normal plantar and dorsiflexion bilateral.  No pronator drift.           MDM       Procedures      Hospitalist Perfect Serve for Admission  2:56 AM    ED Room Number: SER06/06  Patient Name and age:  Be Barajas 75 y.o.  male  Working Diagnosis: small left basal ganglia bleed  Readmission: no  Isolation Requirements:  no  Recommended Level of Care:  step down  Code Status:  Full Code  Department:[place] [facility] - [phone]  Other: Presents with a 90-minute history of right leg weakness and right hand numbness with complaints that his right leg will not support his weight. CT shows small left basal ganglia bleed. He is not on blood thinners. His blood pressures in the 884M systolic. I had the tele-neurologist evaluate him. I spoke with Dr. Gustavo Stout of neurosurgery who will see him in the morning. Consult note: Dr. Ean Cobian (tele-neurology) -evaluated the patient in the ED. Robbi Gomez MD  3:12 AM    Consult note: Dr. Nadia Aquino (neurosurgery) -agrees with plans for admission. No current recommendations. He will plan on seeing the patient later this morning. Robbi Gomez MD  3:12 AM    Consult note: Dr. Kirby Angulo -accepts in transfer. Robbi Gomez MD  3:16 AM    Total critical care time spent exclusive of procedures:  35 min.   Robbi Gomez MD  3:16 AM    EKG: Normal sinus rhythm; rate of 80; normal ST, Martha Moreno MD  3:16 AM

## 2019-12-02 NOTE — CONSULTS
[address]    Name:  Amelie Bell  MR#:  [id number]  :  1955  ACCOUNT #:  [de-identified]  DATE OF SERVICE:  2019    REASON FOR CONSULTATION:  Intracerebral hemorrhage. HISTORY OF PRESENT ILLNESS:  The patient is a 75-year-old gentleman. He was brought to the emergency room because he was unable to bear weight on his right leg and had right hand tingling. Head CT was done, which showed a left-sided basal ganglia hemorrhagic stroke. He was transferred to [facility] for definitive care. He has been diagnosed with advanced white matter disease, and as stated over the last few years he has had some cognitive difficulty and trouble with words. He is little bit sleepy this morning when I saw him, though he was up all night. He is not on anticoagulants. PAST MEDICAL HISTORY:  Significant for ADD, hypertension, and white matter disease. HOME MEDICATIONS:  Cozaar, Lamictal, Adderall, Ziac, Cialis. ALLERGIES:  NO KNOWN DRUG ALLERGIES. FAMILY HISTORY:  Heart disease. SOCIAL HISTORY:  Former smoker. Occasional alcohol. REVIEW OF SYSTEMS:  No nausea, vomiting, fever, chills, chest pain, or shortness of breath. Rest of the 10 systems reviewed were negative except as above. PHYSICAL EXAMINATION:  GENERAL:  He is a well-developed, well-nourished gentleman, sitting in no acute distress. VITAL SIGNS:  His temperature is 97.4, pulse is 68, blood pressure 127/73, respirations 17, and saturations 96% on room air. HEENT:  His head is normocephalic, atraumatic. Pupils are equal, round, and reactive to light. Extraocular movements are intact. Face is symmetric. Tongue and uvula midline. NEUROLOGIC:  He has a right-sided pronator drift. Otherwise, his strength is 5-/5 in his right arm and right leg. He is sleepy, but awakens easily. Oriented x3. His speech is fluent. Recent and remote memory appear appropriate.   Fund of knowledge appears appropriate. Cerebellar exams intact. IMAGING DATA:  CT scan shows a left basal ganglia hemorrhagic stroke. IMPRESSION:  Left basal ganglia hemorrhagic stroke. RECOMMENDATIONS:  This appears to be likely hypertensive hemorrhagic stroke in his left basal ganglia causing his right-sided symptoms. Avoid anticoagulation. Repeat head CT in the morning. This is a nonoperative lesion. We will follow along with you.       Meliton Reyes MD      [id number]  D:  12/02/2019 12:25  T:  12/02/2019 15:53  JOB #:  [id number]

## 2019-12-02 NOTE — PROGRESS NOTES
Transitions of Care  TBD pending medical progress  Await therapy evaluations    Reason for Admission:   Presented to the ED with RLE weakness, right hand numbness and tingling. CT: acute left basal ganglia CVA                    RRAT Score: 5/ low                    Plan for utilizing home health: TBD                         Current Advanced Directive/Advance Care Plan: Not on file, hong Gomes isn MAGDA [facility] [phone]                         Care manager met with patient to introduce self and explain role. Patient is independent no previous home health or equipment needs. Patient has just finished outpatient PT at [facility]. Wife confirmed his PCP to be Dr Coleman Velasco and he sees her regularly and uses the local [facility] as his pharmacy. Confirmed address and insurance information to be correct on the demographic sheet. Care management will continue to follow for transitions of care.  Abhilash Smith RN,CRM    Care Management Interventions  PCP Verified by CM: Yes(Dr Coleman Velasco)  MyChart Signup: No  Discharge Durable Medical Equipment: No  Physical Therapy Consult: Yes  Occupational Therapy Consult: Yes  Speech Therapy Consult: No  Current Support Network: Lives with Spouse(Wife Bella Gomes [phone])  Confirm Follow Up Transport: Family

## 2019-12-02 NOTE — PROGRESS NOTES
Admission Medication Reconciliation:    Information obtained from:  Patient & his wife  RxQuery data available¹:  YES    Comments/Recommendations: Updated PTA meds/reviewed patient's allergies. 1)  The patient's PTA medication list was reviewed with the patient and his wife, as well as compared to the Rx Query report. The patient is not a very reliable historian but his wife states that he only gets his prescriptions filled at [facility] so all active medications should have shown up on the Rx Query report. 2)  Medication changes (since last review): Added  - Lithium carbonate 600 mg PO qHS  - Meloxicam 15 mg PO daily  - Sertraline 50 mg PO daily    Adjusted  - Adderall adjusted to XR 20 mg PO BID - note patient does not always take the afternoon dose  - Lamictal was changed from a 200 mg tablet to 150 mg PO twice daily  - Losartan was changed from 50 mg to 100 mg PO daily    Removed  - Bisoprolol/HCTZ  - Tadalafil  - Venlafaxine     ¹RxQuery pharmacy benefit data reflects medications filled and processed through the patient's insurance, however   this data does NOT capture whether the medication was picked up or is currently being taken by the patient. Allergies:  Patient has no known allergies. 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 Taking? amphetamine-dextroamphetamine XR (ADDERALL XR) 20 mg XR capsule   Yes Yes   Sig: Take 20 mg by mouth two (2) times a day. lamoTRIgine (LAMICTAL) 150 mg tablet   Yes Yes   Sig: Take 150 mg by mouth two (2) times a day.    lithium carbonate 600 mg capsule   Yes Yes Sig: Take 600 mg by mouth nightly. losartan (COZAAR) 100 mg tablet   Yes Yes   Sig: Take 100 mg by mouth daily. meloxicam (MOBIC) 15 mg tablet   Yes Yes   Sig: Take 15 mg by mouth daily. sertraline (ZOLOFT) 50 mg tablet   Yes Yes   Sig: Take 50 mg by mouth daily. Facility-Administered Medications: None       Please contact the main inpatient pharmacy with any questions or concerns at [phone] and we will direct you to the clinical pharmacist covering this patient's care while in-house.    Mell Rosen, BERENICED

## 2019-12-02 NOTE — PROGRESS NOTES
TRANSFER - IN REPORT:    Verbal report received from [place], RN(name) on [employer]  being received from [place] ED (unit) for urgent transfer      Report consisted of patients Situation, Background, Assessment and   Recommendations(SBAR). Information from the following report(s) SBAR, ED Summary, Intake/Output, MAR, Recent Results, Cardiac Rhythm NSR and Alarm Parameters  was reviewed with the receiving nurse. Opportunity for questions and clarification was provided. Assessment completed upon patients arrival to unit and care assumed.        Primary Nurse Erika Alcocer RN performed a dual skin assessment on this patient No impairment noted  Robe score is 22

## 2019-12-02 NOTE — PROGRESS NOTES
Problem: Mobility Impaired (Adult and Pediatric)  Goal: *Acute Goals and Plan of Care (Insert Text)  Description  FUNCTIONAL STATUS PRIOR TO ADMISSION: Patient was independent and active without use of DME.    HOME SUPPORT PRIOR TO ADMISSION: The patient lived with wife but did not require assist.    Physical Therapy Goals  Initiated 12/2/2019  1. Patient will move from supine to sit and sit to supine , scoot up and down and roll side to side in bed with modified independence within 7 day(s). 2.  Patient will transfer from bed to chair and chair to bed with contact guard assist using the least restrictive device within 7 day(s). 3.  Patient will perform sit to stand with contact guard assist within 7 day(s). 4.  Patient will ambulate with minimal assistance/contact guard assist for 150 feet with the least restrictive device within 7 day(s). 5.  Patient will ascend/descend 6 stairs with single handrail(s) with minimal assistance/contact guard assist within 7 day(s). Outcome: Progressing Towards Goal  PHYSICAL THERAPY EVALUATION  Patient: Laura Eng (48 y.o. male)  Date: 12/2/2019  Primary Diagnosis: Hemorrhagic stroke [facility]) [I61.9]        Precautions: Fall         ASSESSMENT  Based on the objective data described below, the patient presents with impaired coordination RUE/RLE, decreased strength RLE (hip flexion 3+/5, knee extension 5/5), impaired sensation RLE (paresthesia and diminished to light touch below knee), impaired balance, unsteady gait, and overall decline in functional mobility s/p admission for CVA - imaging positive for acute parenchymal hemorrhage in the left basal ganglia. At baseline, pt lives with his wife and is fully independent with mobility and ADLs. Pt transferred supine to sit with CGA, sit<>stand with [place], and ambulated with modA x2.   During ambulation, pt able to maintain stance on RLE, however demonstrated difficulty advancing leg and reported \"it feels like it weighs 100 lbs. \"  Session ended sitting up in chair. Educated patient on BE FAST acronym for signs/symptoms of stroke. Recommending short stay at [facility] rehab. If this is not an option, pt will need HH PT, 24/7 supervision and physical assistance with all mobility. Current Level of Function Impacting Discharge (mobility/balance): modA x2 for ambulation     Functional Outcome Measure: The patient scored 5/56 on the Rosa outcome measure which is indicative of high fall risk. Other factors to consider for discharge: independent baseline      Patient will benefit from skilled therapy intervention to address the above noted impairments. PLAN :  Recommendations and Planned Interventions: bed mobility training, transfer training, gait training, therapeutic exercises, neuromuscular re-education, patient and family training/education, and therapeutic activities      Frequency/Duration: Patient will be followed by physical therapy:  5 times a week to address goals. Recommendation for discharge: (in order for the patient to meet his/her long term goals)  Short stay at  rehab. 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 No pertinent surgical history.     Home Situation  Home Environment: Private residence  # Steps to Enter: 6  Rails to Enter: Yes  Hand Rails : Bilateral(>arms width)  One/Two Story Residence: Two story  Interior Rails: Both  Living Alone: Yes  Current DME Used/Available at Home: None  Tub or Shower Type: Shower    EXAMINATION/PRESENTATION/DECISION MAKING:   Critical Behavior:  Neurologic State: Alert  Orientation Level: Oriented X4  Cognition: Follows commands, Decreased attention/concentration  Safety/Judgement: Awareness of environment, Decreased awareness of need for assistance, Decreased awareness of need for safety, Decreased insight into deficits, Fall prevention    Range Of Motion:  AROM: Generally decreased, functional(RUE and RLE)                       Strength:    Strength: Generally decreased, functional(RUE and RLE)                    Tone & Sensation:   Tone: Normal              Sensation: Impaired(reporting numbness and tingling in RLE from knee down)               Coordination:  Coordination: Generally decreased, functional(RUE and RLE)  Vision:   Tracking: Able to track stimulus in all quadrants w/o difficulty  Diplopia: No  Acuity: Within Defined Limits  Corrective Lenses: Reading glasses  Functional Mobility:  Bed Mobility:     Supine to Sit: Contact guard assistance  Sit to Supine: (in chair)  Scooting: Contact guard assistance  Transfers:  Sit to Stand: Minimum assistance  Stand to Sit: Minimum assistance        Bed to Chair: Minimum assistance              Balance:   Sitting: Intact  Standing: Impaired  Standing - Static: Fair;Constant support  Standing - Dynamic : Poor;Constant support  Ambulation/Gait Training:  Distance (ft): 30 Feet (ft)  Assistive Device: Gait belt(HHA x2)  Ambulation - Level of Assistance:  Moderate assistance;Assist x2        Gait Abnormalities: Decreased step clearance;Shuffling gait;Trunk sway increased           Stance: Right decreased            Functional Measure:  Rosa Balance Test:    Sitting to Standin  Standing Unsupported: 0  Sitting with Back Unsupported: 3  Standing to Sittin  Transfers: 1  Standing Unsupported with Eyes Closed: 0  Standing Unsupported with Feet Together: 0  Reach Forward with Outstretched Arm: 0   Object: 0  Turn to Look Over Shoulders: 0  Turn 360 Degrees: 0  Alternate Foot on Step/Stool: 0  Standing Unsupported One Foot in Front: 0  Stand on One Le  Total: 5/56         56=Maximum possible score;   0-20=High fall risk  21-40=Moderate fall risk   41-56=Low fall risk          Physical Therapy Evaluation Charge Determination   History Examination Presentation Decision-Making   HIGH Complexity :3+ comorbidities / personal factors will impact the outcome/ POC  HIGH Complexity : 4+ Standardized tests and measures addressing body structure, function, activity limitation and / or participation in recreation  LOW Complexity : Stable, uncomplicated  LOW Complexity : FOTO score of       Based on the above components, the patient evaluation is determined to be of the following complexity level: LOW     Activity Tolerance:   Good  Please refer to the flowsheet for vital signs taken during this treatment. After treatment patient left in no apparent distress:   Sitting in chair, Call bell within reach, Bed / chair alarm activated, and Caregiver / family present    COMMUNICATION/EDUCATION:   The patients plan of care was discussed with: Occupational Therapist and Registered Nurse. Fall prevention education was provided and the patient/caregiver indicated understanding., Patient/family have participated as able in goal setting and plan of care. , and Patient/family agree to work toward stated goals and plan of care. Educated patient on BE FAST acronym for signs/symptoms of stroke.     Thank you for this referral.  Kalpana Crain, PT, DPT   Time Calculation: 37 mins

## 2019-12-02 NOTE — PROGRESS NOTES
Problem: Self Care Deficits Care Plan (Adult)  Goal: *Acute Goals and Plan of Care (Insert Text)  Description    FUNCTIONAL STATUS PRIOR TO ADMISSION: Patient was independent and active without use of DME.     HOME SUPPORT: The patient lived with wife but did not require assist.    Occupational Therapy Goals  Initiated 12/2/2019  1. Patient will perform grooming standing at sink using BUE for FM/GM tasks with independence within 7 day(s). 2.  Patient will perform bathing with independence within 7 day(s). 3.  Patient will perform lower body dressing with independence within 7 day(s). 4.  Patient will perform toilet transfers with independence within 7 day(s). 5.  Patient will perform all aspects of toileting with independence within 7 day(s). 6.  Patient will participate in upper extremity therapeutic exercise/activities with independence for 5 minutes within 7 day(s). 7.  Patient will utilize energy conservation techniques during functional activities with verbal cues within 7 day(s). Outcome: Progressing Towards Goal    OCCUPATIONAL THERAPY EVALUATION  Patient: Jb Suarez (9 y.o. male)  Date: 12/2/2019  Primary Diagnosis: Hemorrhagic stroke ([facility] [address] [I61.9]        Precautions: falls, SBP<140      ASSESSMENT  Based on the objective data described below, the patient presents with limited ADL performance 2* impaired balance, generalized weakness, decreased functional activity tolerance and mildly impaired FM coordination in RUE s/p admission for hemorrhagic stroke. Imaging positive for acute L basal ganglia/corona radiata hemorrhage. At baseline, patient lives in 2 level home with wife and was IND, working and driving. Today, patient required CGA for bed mobility and up to min A x1-2 for functional mobility. Patient able to stand at sink with CGA to complete oral care with setup. Patient returned to chair at end of session, left with call bell in reach, RN aware and all needs met.  Patient high fall risk at this time and may benefit from short IP rehab stay once medically stable to maximize safety and IND prior to transition home. Will continue to follow. Current Level of Function Impacting Discharge (ADLs/self-care): up to min A for ADLs and mobility    Functional Outcome Measure: The patient scored 64/66 on the [facility] assessment outcome measure which is indicative of mild deficits in RUE function. Other factors to consider for discharge: was IND, working part time () and driving. Patient will benefit from skilled therapy intervention to address the above noted impairments. PLAN :  Recommendations and Planned Interventions: self care training, functional mobility training, therapeutic exercise, balance training, therapeutic activities, endurance activities, neuromuscular re-education, patient education, home safety training, and family training/education    Frequency/Duration: Patient will be followed by occupational therapy 5 times a week to address goals. Recommendation for discharge: (in order for the patient to meet his/her long term goals)  Therapy 3 hours per day 5-7 days per week     This discharge recommendation:  Has not yet been discussed the attending provider and/or case management    IF patient discharges home will need the following DME: to be determined (TBD)       SUBJECTIVE:   Patient stated Will I be home in time for my concert Friday? 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 No pertinent surgical history.     Expanded or extensive additional review of patient history:     Home Situation  Home Environment: Private residence  # Steps to Enter: 6  Rails to Enter: Yes  Hand Rails : Bilateral(>arms width)  One/Two Story Residence: Two story  Interior Rails: Both  Living Alone: Yes  Current DME Used/Available at Home: None  Tub or Shower Type: Shower    Hand dominance: Right    EXAMINATION OF PERFORMANCE DEFICITS:  Cognitive/Behavioral Status:  Neurologic State: Alert  Orientation Level: Oriented X4  Cognition: Follows commands;Decreased attention/concentration  Perception: Appears intact  Perseveration: No perseveration noted  Safety/Judgement: Awareness of environment;Decreased awareness of need for assistance;Decreased awareness of need for safety;Decreased insight into deficits; Fall prevention    Skin: Appears grossly intact    Edema: none noted in BUEs    Hearing:       Vision/Perceptual:    Tracking: Able to track stimulus in all quadrants w/o difficulty    Diplopia: No    Acuity: Within Defined Limits    Corrective Lenses: Reading glasses    Range of Motion:  In BUEs  AROM: Generally decreased, functional(RUE<LUE)    Strength: In BUEs  Strength: Generally decreased, functional(RUE<LUE)    Coordination:  Coordination: Generally decreased, functional(RUE<LUE)  Fine Motor Skills-Upper: Left Intact; Right Impaired    Gross Motor Skills-Upper: Left Intact; Right Intact    Tone & Sensation:  In BUEs  Tone: Normal  Sensation: Impaired(reporting numbness and tingling in RLE from knee down)     Balance:  Sitting: Intact  Standing: Impaired  Standing - Static: Good  Standing - Dynamic : Fair    Functional Mobility and Transfers for ADLs:  Bed Mobility:  Supine to Sit: Contact guard assistance  Scooting: Contact guard assistance    Transfers:  Sit to Stand: Contact guard assistance  Stand to Sit: Contact guard assistance  Bed to Chair: Contact guard assistance  Toilet Transfer : Contact guard assistance(Infer per obs of func reach, balance, strength)    ADL Assessment:  Feeding: Setup    Oral Facial Hygiene/Grooming: Setup    Bathing: Contact guard assistance(Infer if standing, supervision if sitting)    Upper Body Dressing: Setup    Lower Body Dressing: Contact guard assistance(Infer if standing 2* balance)    Toileting: Contact guard assistance(Infer if standing 2* balance)     ADL Intervention and task modifications:    Grooming  Brushing Teeth: Contact guard assistance(standing at sink )  Cues: Verbal cues provided    Cognitive Retraining  Safety/Judgement: Awareness of environment;Decreased awareness of need for assistance;Decreased awareness of need for safety;Decreased insight into deficits; Fall prevention    Functional Measure:  Fugl-Ryan Assessment of Motor Recovery after Stroke:     Reflex Activity  Flexors/Biceps/Fingers: Can be elicited  Extensors/Triceps: Can be elicited  Reflex Subtotal: 4    Volitional Movement Within Synergies  Shoulder Retraction: Full  Shoulder Elevation: Full  Shoulder Abduction (90 degrees): Full  Shoulder External Rotation: Full  Elbow Flexion: Full  Forearm Supination: Full  Shoulder Adduction/Internal Rotation: Full  Elbow Extension: Full  Forearm Pronation: Full  Subtotal: 18    Volitional Movement Mixing Synergies  Hand to Lumbar Spine: Full  Shoulder Flexion (0-90 degrees): Full  Pronation-Supination: Full  Subtotal: 6    Volitional Movement With Little or No Synergy  Shoulder Abduction (0-90 degrees): Full  Shoulder Flexion ( degrees): Full  Pronation/Supination: Full  Subtotal : 6    Normal Reflex Activity  Biceps, Triceps, Finger Flexors:  Full  Subtotal : 2    Upper Extremity Total   Upper Extremity Total: 36    Wrist  Stability at 15 Degree Dorsiflexion: Full  Repeated Dorsiflexion/ Volar Flexion: Full  Stability at 15 Degree Dorsiflexion: Full  Repeated Dorsiflexion/ Volar Flexion: Full  Circumduction: Full  Wrist Total: 10    Hand  Mass Flexion: Full  Mass Extension: Full  Grasp A: Full  Grasp B: Full  Grasp C: Full  Grasp D: Full  Grasp E: Full  Hand Total: 14    Coordination/Speed  Tremor: Slight  Dysmetria: Slight  Time: <1s  Coordination/Speed Total : 4    Total A-D  Total A-D (Motor Function): 64/66         This is a reliable/valid measure of arm function after a neurological event. It has established value to characterize functional status and for measuring spontaneous and therapy-induced recovery; tests proximal and distal motor functions. Fugl-Ryan Assessment - UE scores recorded between five and 30 days post neurologic event can be used to predict UE recovery at six months post neurologic event. Severe = 0-21 points   Moderately Severe = 22-33 points   Moderate = 34-47 points   Mild = 48-66 points  MEY Felder, PARK Tan, & EDGAR Tovar (1992). Measurement of motor recovery after stroke: Outcome assessment and sample size requirements. Stroke, 23, pp. 9049-3414.   --------------------------------------------------------------------------------------------------------------------------------------------------------------------  MCID:  Stroke:   Maru Toth et al, 2001; n = 171; mean age 79 (6) years; assessed within 16 (12) days of stroke, Acute Stroke)  FMA Motor Scores from Admission to Discharge   10 point increase in FMA Upper Extremity = 1.5 change in discharge FIM   10 point increase in FMA Lower Extremity = 1.9 change in discharge FIM  MDC:   Stroke:   Eda Wang et al, 2008, n = 14, mean age = 59.9 (14.6) years, assessed on average 14 (6.5) months post stroke, Chronic Stroke)   FMA = 5.2 points for the Upper Extremity portion of the assessment       Occupational Therapy Evaluation Charge Determination   History Examination Decision-Making   LOW Complexity : Brief history review  LOW Complexity : 1-3 performance deficits relating to physical, cognitive , or psychosocial skils that result in activity limitations and / or participation restrictions  MEDIUM Complexity : Patient may present with comorbidities that affect occupational performnce.  Miniml to moderate modification of tasks or assistance (eg, physical or verbal ) with assesment(s) is necessary to enable patient to complete evaluation       Based on the above components, the patient evaluation is determined to be of the following complexity level: LOW   Pain Rating:  none    Activity Tolerance:   Good and requires rest breaks  Please refer to the flowsheet for vital signs taken during this treatment. After treatment patient left in no apparent distress:    Sitting in chair, Call bell within reach, Caregiver / family present, and RN aware     COMMUNICATION/EDUCATION:   The patients plan of care was discussed with: Physical Therapist and Registered Nurse. Home safety education was provided and the patient/caregiver indicated understanding. and Patient/family have participated as able in goal setting and plan of care. This patients plan of care is appropriate for delegation to [facility].     Thank you for this referral.  Riri Wood OT  Time Calculation: 36 mins

## 2019-12-02 NOTE — CONSULTS
Consult dictated. 49-year-old male with hypertension admitted with a typical, well-circumscribed, basal ganglia hypertensive bleed about 1 cm in size. He has mild right upper extremity incoordination and right leg weakness. Treatment essentially is supportive. Continue with PT. Avoid antiplatelets for at least 1 month. Repeat CT brain tomorrow.   Luis Daniel Garcia MD

## 2019-12-02 NOTE — CONSULTS
[address]    Name:  Vicki Alcantar  MR#:  [id number]  :  1955  ACCOUNT #:  [de-identified]  DATE OF SERVICE:  2019    REQUESTING PHYSICIAN:  Justin    REASON FOR EVALUATION:  Stroke. HISTORY OF PRESENT ILLNESS:  The patient is a 76-year-old male with history of hypertension, who was doing well until yesterday evening when he started to notice some weakness in the right lower extremity and it just gave out. He was also not feeling well and had some tingling sensation in his right hand. He laid back down but symptoms did not improve, and he was taken to [facility]. From there, he was transferred to [facility] because there was a small left-sided basal ganglia bleed. He still has right lower extremity weakness and incoordination of the right upper extremity, but denies any changes in speech. No headache, changes in vision or swallowing ability. No previous history of stroke. He has been told in the past that he may have white matter changes in his brain and has seen a neurologist.  He does measure his blood pressure routinely and sometimes, it is elevated. PAST MEDICAL HISTORY:  As mentioned above. He also has ADD, hypertension. HOME MEDICATIONS:  Losartan, Lamictal, Adderall, Cialis, venlafaxine. ALLERGIES:  NONE. FAMILY HISTORY:  Noncontributory. SOCIAL HISTORY:  He used to smoke about one pack per day but has not smoked since . Occasionally uses alcohol. REVIEW OF SYSTEMS:  As mentioned above. PHYSICAL EXAMINATION:  GENERAL:  The patient is alert, fully oriented. VITAL SIGNS:  Blood pressure 110/73, temperature 97.4, pulse 76. HEENT:  Speech is clear. Comprehension is normal.  Pupils are equal, round, reactive. NEUROLOGIC:  Extraocular movements are full. Face is symmetric. Tongue is midline. Hearing is baseline. Muscle tone and bulk are normal.  Strength normal in both upper extremities.   He has trace weakness in the right lower extremity at 4+/5. Finger-nose-finger coordination is slightly impaired in the right. He is able to stand up, but ambulates with a slight limp favoring his right lower extremity. HEART:  Regular rate and rhythm. CHEST:  Clear. ABDOMEN:  Soft, nontender. Positive bowel sounds. EXTREMITIES:  No edema. LABORATORY DATA:  CBC and chemistries unremarkable. Lipid profile, triglycerides 304, HDL 58, .2. Hemoglobin A1c is 5.2. DIAGNOSTIC DATA:  CT brain imaging was independently reviewed. There is acute hemorrhage in the left basal ganglia/corona radiata about 1.1 x 2.5 cm in size. ASSESSMENT AND PLAN:  A 58-year-old male with history of hypertension, admitted with a typical, well-circumscribed, basal ganglia hypertensive bleed about 1 cm x 2 cm in size. He has mild right upper extremity incoordination and right leg right leg weakness. Treatment essentially is supportive and continue with physical therapy. Avoid anti-platelets for at least 1 month. Repeat CT brain tomorrow and if stable, we can initiate discharge planning. Importance of strict blood pressure control was discussed and target systolic blood pressure less than 140. Thank you for this consultation.       Guillermo Saldivar MD      [id number]  D:  12/02/2019 10:36  T:  12/02/2019 12:35  JOB #:  [id number]

## 2019-12-03 ENCOUNTER — APPOINTMENT (OUTPATIENT)
Dept: CT IMAGING | Age: 64
DRG: 066 | End: 2019-12-03
Attending: PSYCHIATRY & NEUROLOGY
Payer: COMMERCIAL

## 2019-12-03 PROCEDURE — 74011250637 HC RX REV CODE- 250/637: Performed by: HOSPITALIST

## 2019-12-03 PROCEDURE — 97535 SELF CARE MNGMENT TRAINING: CPT

## 2019-12-03 PROCEDURE — 70450 CT HEAD/BRAIN W/O DYE: CPT

## 2019-12-03 PROCEDURE — 97530 THERAPEUTIC ACTIVITIES: CPT

## 2019-12-03 PROCEDURE — 74011250637 HC RX REV CODE- 250/637: Performed by: NURSE PRACTITIONER

## 2019-12-03 PROCEDURE — 65660000000 HC RM CCU STEPDOWN

## 2019-12-03 PROCEDURE — 97116 GAIT TRAINING THERAPY: CPT

## 2019-12-03 RX ORDER — ATORVASTATIN CALCIUM 40 MG/1
40 TABLET, FILM COATED ORAL
Status: DISCONTINUED | OUTPATIENT
Start: 2019-12-03 | End: 2019-12-07 | Stop reason: HOSPADM

## 2019-12-03 RX ORDER — ACETAMINOPHEN 500 MG
1 TABLET ORAL DAILY
Qty: 1 KIT | Refills: 0 | Status: SHIPPED | OUTPATIENT
Start: 2019-12-03

## 2019-12-03 RX ORDER — ATORVASTATIN CALCIUM 40 MG/1
40 TABLET, FILM COATED ORAL
Qty: 30 TAB | Refills: 0 | Status: SHIPPED | OUTPATIENT
Start: 2019-12-03

## 2019-12-03 RX ADMIN — ATORVASTATIN CALCIUM 40 MG: 40 TABLET, FILM COATED ORAL at 21:03

## 2019-12-03 RX ADMIN — LAMOTRIGINE 150 MG: 100 TABLET ORAL at 17:41

## 2019-12-03 RX ADMIN — LAMOTRIGINE 150 MG: 100 TABLET ORAL at 08:28

## 2019-12-03 RX ADMIN — LITHIUM CARBONATE 600 MG: 300 CAPSULE, GELATIN COATED ORAL at 21:03

## 2019-12-03 RX ADMIN — LOSARTAN POTASSIUM 100 MG: 50 TABLET, FILM COATED ORAL at 08:28

## 2019-12-03 RX ADMIN — SERTRALINE HYDROCHLORIDE 50 MG: 50 TABLET ORAL at 08:28

## 2019-12-03 NOTE — DISCHARGE INSTRUCTIONS
Learning About a Hemorrhagic Stroke  What is a hemorrhagic stroke? When you have a hemorrhagic (say \"hoe-krv-FA-sam\") stroke, it means that a blood vessel in the brain has burst open or has started to leak. When the blood spills into the space inside and around the brain, it damages nearby nerve cells. This is different from an ischemic (say \"vvs-YDK-iouf\") stroke, which happens when a blood clot blocks a blood vessel in the brain. The brain damage from a stroke starts within minutes. Quick treatment can help limit damage to the brain and make recovery more likely. People who have had a stroke may have a hard time talking, understanding things, and making decisions. They may have to relearn daily activities, such as how to eat, bathe, and dress. How well someone recovers from a stroke depends on how quickly the person gets to the hospital, where in the brain the stroke happened, and how severe it was. Stroke rehabilitation, which includes training and therapy, also helps people recover. What are the symptoms? If you have any of these symptoms, call 911 or other emergency services right away. · You have symptoms of a stroke. These may include:  ? Sudden numbness, tingling, weakness, or loss of movement in your face, arm, or leg, especially on only one side of your body. ? Sudden vision changes. ? Sudden trouble speaking. ? Sudden confusion or trouble understanding simple statements. ? Sudden problems with walking or balance. ? A sudden, severe headache that is different from past headaches. See your doctor if you have symptoms that seem like a stroke, even if they go away quickly. You may have had a transient ischemic attack (TIA), sometimes called a mini-stroke. A TIA is a warning that a stroke may happen soon. Getting early treatment for a TIA can help prevent a stroke. What causes a hemorrhagic stroke? A hemorrhagic stroke happens to blood vessels that have been weakened.  The most common causes of weakened blood vessels in the brain are:  · A brain aneurysm. This is a bulging, weak part of a blood vessel. It can put pressure on nerves, or it can bleed or break open (rupture). · A brain AVM. This is an abnormal knot of weak blood vessels that some people are born with. · Head injury. · Chronic uncontrolled high blood pressure. Blood pressure that is too high over the long term increases your risk for stroke. · Very high blood pressure. Very high blood pressure that comes on suddenly is dangerous. It is a medical emergency. Anticoagulant and antiplatelet medicines can also cause bleeding in the brain. These medicines, also called blood thinners, increase the time it takes for a blood clot to form. How is hemorrhagic stroke treated? Emergency treatment is done to stop the bleeding and prevent damage to the brain. · You may need surgery to repair an aneurysm or to remove the blood that has built up inside the brain. · You may be given medicine to stop the bleeding. · You will be closely watched for signs of increased pressure on the brain. These signs include restlessness, confusion, trouble following commands, and headache. · You may take medicine to manage high blood pressure. Ask your doctor if a stroke rehab program is right for you. Rehab increases your chances of getting back some of the abilities you lost.  How can you prevent another stroke? · Work with your doctor to treat health problems, such as high blood pressure, that raise your chances of having another stroke. · Be safe with medicines. Take your medicine exactly as prescribed. Call your doctor if you think you are having a problem with your medicine. · Have a healthy lifestyle. ? Do not smoke or allow others to smoke around you. If you need help quitting, talk to your doctor about stop-smoking programs and medicines. These can increase your chances of quitting for good. Smoking makes a stroke more likely. ?  Limit alcohol to 2 drinks a day for men and 1 drink a day for women. ? Be active. Ask your doctor what type and level of activity is safe for you.  ? Eat heart-healthy foods, like fruits, vegetables, and high-fiber foods. ? Stay at a healthy weight. Lose weight if you need to. Follow-up care is a key part of your treatment and safety. Be sure to make and go to all appointments, and call your doctor if you are having problems. It's also a good idea to know your test results and keep a list of the medicines you take. Where can you learn more? Go to http://shirin-veronika.info/. Enter R416 in the search box to learn more about \"Learning About a Hemorrhagic Stroke. \"  Current as of: September 26, 2018  Content Version: 12.2  © 4880-6182 [employer]. Care instructions adapted under license by [employer] (which disclaims liability or warranty for this information). If you have questions about a medical condition or this instruction, always ask your healthcare professional. [place] [address] any warranty or liability for your use of this information.        Please bring this form with you to show your primary care provider at your follow-up appointment. Primary care provider:  Dr. Nora Quigley MD    Discharging provider:  Azucena Umana MD    You have been admitted to the hospital with the following diagnoses:  · Hemorrhagic stroke [facility]) [I61.9]    FOLLOW-UP CARE RECOMMENDATIONS:    Avoid ASPIRIN and NSAIDs    APPOINTMENTS:  · Follow-up with primary care provider, Dr. Nora Quigley MD  -  Please call [phone] shortly after discharge to set up an appointment to be seen in  1 week   · Neurology     FOLLOW-UP TESTS recommended: CT head in 4-6 weeks    PENDING TEST RESULTS:  At the time of your discharge the following test results are still pending: none  Please make sure you review these results with your outpatient follow-up provider(s).     SYMPTOMS to watch for: chest pain, shortness of breath, fever, chills, nausea, vomiting, diarrhea, change in mentation, falling, weakness, bleeding. DIET/what to eat:  Cardiac Diet    ACTIVITY:  Activity as tolerated    What to do if new or unexpected symptoms occur? If you experience any of the above symptoms (or should other concerns or questions arise after discharge) please call your primary care physician. Return to the emergency room if you cannot get hold of your doctor. · It is very important that you keep your follow-up appointment(s). · Please bring discharge papers, medication list (and/or medication bottles) to your follow-up appointments for review by your outpatient provider(s). · Please check the list of medications and be sure it includes every medication (even non-prescription medications) that your provider wants you to take. · It is important that you take the medication exactly as they are prescribed. · Keep your medication in the bottles provided by the pharmacist and keep a list of the medication names, dosages, and times to be taken in your wallet. · Do not take other medications without consulting your doctor. · If you have any questions about your medications or other instructions, please talk to your nurse or care provider before you leave the hospital.    I understand that if any problems occur once I am at home I am to contact my physician. These instructions were explained to me and I had the opportunity to ask questions.

## 2019-12-03 NOTE — PROGRESS NOTES
Problem: Falls - Risk of  Goal: *Absence of Falls  Description  Document Summer Campos Fall Risk and appropriate interventions in the flowsheet.   Outcome: Progressing Towards Goal  Note: Fall Risk Interventions:  Mobility Interventions: Assess mobility with egress test, Communicate number of staff needed for ambulation/transfer, Bed/chair exit alarm, Patient to call before getting OOB, Utilize walker, cane, or other assistive device         Medication Interventions: Patient to call before getting OOB, Teach patient to arise slowly    Elimination Interventions: Call light in reach, Stay With Me (per policy), Toilet paper/wipes in reach, Toileting schedule/hourly rounds, Urinal in reach              Problem: Hemorrhagic Stroke: Day 2  Goal: Activity/Safety  Outcome: Progressing Towards Goal  Goal: Consults, if ordered  Outcome: Progressing Towards Goal  Goal: Diagnostic Test/Procedures  Outcome: Progressing Towards Goal  Goal: Nutrition/Diet  Outcome: Progressing Towards Goal  Goal: Medications  Outcome: Progressing Towards Goal  Goal: *Verbalizes anxiety and depression are reduced or absent  Outcome: Progressing Towards Goal  Goal: *Absence of aspiration  Outcome: Progressing Towards Goal  Goal: *Absence of signs and symptoms of DVT  Outcome: Progressing Towards Goal  Goal: *Tolerating diet  Outcome: Progressing Towards Goal     Problem: Hemorrhagic Stroke: Day 3  Goal: Activity/Safety  Outcome: Progressing Towards Goal  Goal: Medications  Outcome: Progressing Towards Goal

## 2019-12-03 NOTE — PROGRESS NOTES
Problem: Mobility Impaired (Adult and Pediatric)  Goal: *Acute Goals and Plan of Care (Insert Text)  Description  FUNCTIONAL STATUS PRIOR TO ADMISSION: Patient was independent and active without use of DME.    HOME SUPPORT PRIOR TO ADMISSION: The patient lived with wife but did not require assist.    Physical Therapy Goals  Initiated 12/2/2019  1. Patient will move from supine to sit and sit to supine , scoot up and down and roll side to side in bed with modified independence within 7 day(s). 2.  Patient will transfer from bed to chair and chair to bed with contact guard assist using the least restrictive device within 7 day(s). 3.  Patient will perform sit to stand with contact guard assist within 7 day(s). 4.  Patient will ambulate with minimal assistance/contact guard assist for 150 feet with the least restrictive device within 7 day(s). 5.  Patient will ascend/descend 6 stairs with single handrail(s) with minimal assistance/contact guard assist within 7 day(s). Outcome: Progressing Towards Goal  PHYSICAL THERAPY TREATMENT  Patient: Kamryn Scales (52 y.o. male)  Date: 12/3/2019  Diagnosis: Hemorrhagic stroke [facility]) [I61.9]   <principal problem not specified>       Precautions:  Fall  Chart, physical therapy assessment, plan of care and goals were reviewed. ASSESSMENT  Patient continues with skilled PT services and is progressing towards goals. Session focused on ambulation and stair training. Pt initially required modA x1 for ambulation, however fatigued quickly, requiring modA x2. Pt required modA and step by step sequencing to navigate stairs. He continues to demonstrate RLE weakness (worse proximally), decreased safety awareness, and impaired cognition. Pt was fully independent at baseline and is now requiring assist of two people for mobility. Strongly recommending IP rehab upon discharge.        Current Level of Function Impacting Discharge (mobility/balance): modA x2 for ambulation Other factors to consider for discharge: independent baseline, supportive wife who can provide 24/7 supervision, stairs in home          PLAN :  Patient continues to benefit from skilled intervention to address the above impairments. Continue treatment per established plan of care. to address goals. Recommendation for discharge: (in order for the patient to meet his/her long term goals)  Therapy 3 hours per day 5-7 days per week    This discharge recommendation:  Has been made in collaboration with the attending provider and/or case management    IF patient discharges home will need the following DME: to be determined (TBD)       SUBJECTIVE:   Patient stated I'm just trying something out.     OBJECTIVE DATA SUMMARY:   Critical Behavior:  Neurologic State: Alert  Orientation Level: Oriented X4  Cognition: Appropriate decision making, Appropriate for age attention/concentration, Appropriate safety awareness, Follows commands  Safety/Judgement: Awareness of environment, Decreased awareness of need for assistance, Decreased awareness of need for safety, Decreased insight into deficits, Fall prevention  Functional Mobility Training:  Bed Mobility:     Supine to Sit: Contact guard assistance  Sit to Supine: (in chair)  Scooting: Contact guard assistance        Transfers:  Sit to Stand: Contact guard assistance                                Balance:  Sitting: Intact  Standing: Impaired; With support  Standing - Static: Fair;Constant support  Standing - Dynamic : Fair;Poor(initially fair, poor with fatiuge)  Ambulation/Gait Training:  Distance (ft): 50 Feet (ft)(x2 reps)  Assistive Device: Gait belt(HHA )  Ambulation - Level of Assistance: Moderate assistance;Assist x2        Gait Abnormalities: Decreased step clearance;Shuffling gait;Trunk sway increased        Base of Support: Widened     Speed/Amarilys: Shuffled; Slow  Step Length: Left shortened;Right shortened  Swing Pattern: Left asymmetrical;Right asymmetrical                 Stairs:  Number of Stairs Trained: 8  Stairs - Level of Assistance: Moderate assistance   Rail Use: Right     Activity Tolerance:   Fair, fatigued quickly   Please refer to the flowsheet for vital signs taken during this treatment.     After treatment patient left in no apparent distress:   Sitting in chair, Call bell within reach, Bed / chair alarm activated, and Caregiver / family present    COMMUNICATION/COLLABORATION:   The patients plan of care was discussed with: Occupational Therapist, Registered Nurse, and     Georgina Anne PT, DPT   Time Calculation: 31 mins

## 2019-12-03 NOTE — PROGRESS NOTES
Physical Therapy: Defer    Chart reviewed and attempted to see pt for PT treatment. Pt currently off the floor. Will follow up as able and appropriate.       Hannah Siemens, PT, DPT

## 2019-12-03 NOTE — PROGRESS NOTES
TORRES:    -Pursing Inpatient Rehab- [facility]. Likely to require insurance authorization. Patient expected to discharge within 24 hours pending medical clearance. Therapy is recommending IPR. Met with patient and his spouse, Ramona Cho [phone] to offer freedom of choice. Family selected [id number] referral via [employer]; Awaiting response. Spoke with [place] , [place] [phone] to discuss referral. Referral pending. Please note, Patient has Medicare Part A (only) and [employer] PPO policy. Copy of insurance card placed on chart. Likely to require insurance authorization. CM to follow.  ERIN Rivas,CRM

## 2019-12-03 NOTE — PROGRESS NOTES
Neurosurgery     Reviewed latest head CT. Left basal ganglia hemorrhage is stable. No further scans or follow-ups needed with neurosurgery.     Orvis Cramp

## 2019-12-03 NOTE — PROGRESS NOTES
Problem: Falls - Risk of  Goal: *Absence of Falls  Description  Document Lorna Dear Fall Risk and appropriate interventions in the flowsheet.   Outcome: Progressing Towards Goal  Note: Fall Risk Interventions:  Mobility Interventions: Patient to call before getting OOB         Medication Interventions: Patient to call before getting OOB    Elimination Interventions: Call light in reach

## 2019-12-03 NOTE — PROGRESS NOTES
Bedside and Verbal shift change report given to Reshma Brown RN (oncoming nurse) by Dirk Mchugh RN (offgoing nurse).  Report included the following information SBAR, Kardex, MAR, Recent Results and Cardiac Rhythm SR.

## 2019-12-03 NOTE — PROGRESS NOTES
Problem: Self Care Deficits Care Plan (Adult)  Goal: *Acute Goals and Plan of Care (Insert Text)  Description    FUNCTIONAL STATUS PRIOR TO ADMISSION: Patient was independent and active without use of DME.     HOME SUPPORT: The patient lived with wife but did not require assist.    Occupational Therapy Goals  Initiated 12/2/2019  1. Patient will perform grooming standing at sink using BUE for FM/GM tasks with independence within 7 day(s). 2.  Patient will perform bathing with independence within 7 day(s). 3.  Patient will perform lower body dressing with independence within 7 day(s). 4.  Patient will perform toilet transfers with independence within 7 day(s). 5.  Patient will perform all aspects of toileting with independence within 7 day(s). 6.  Patient will participate in upper extremity therapeutic exercise/activities with independence for 5 minutes within 7 day(s). 7.  Patient will utilize energy conservation techniques during functional activities with verbal cues within 7 day(s). 12/3/2019 1216 by Rae Nix OT  Outcome: Progressing Towards Goal     OCCUPATIONAL THERAPY TREATMENT  Patient: Li Pires (39 y.o. male)  Date: 12/3/2019  Diagnosis: Hemorrhagic [facility] ([facility] [I61.9]   <principal problem not specified>       Precautions:  fall, SBP <140  Chart, occupational therapy assessment, plan of care, and goals were reviewed. ASSESSMENT  Patient is progressing in OT goals but remains limited by proximal RLE weakness, RUE impaired gross and fine motor coordination, R hand tingling, impaired standing balance, and impaired safety awareness/ insight into deficits. Patient is R hand dominant. RUE is functional for most tasks with additional time but limited for higher level tasks. He remains below functional baseline, requires assistance with all OOB mobility, and is at significant risk for falls. Patient required increased assistance for mobility with fatigue.   Patient and family were receptive to eduction on neuroplasticity/ importance of functional task practice as well as BEFAST signs/ symptoms of CVA. Recommend inpatient rehab to maximize functional/ neurological recovery. Current Level of Function Impacting Discharge (ADLs): Up to minimum assistance ADLs for steadying, up to Moderate assistance x2 for functional mobility with fatigue       PLAN :  Patient continues to benefit from skilled intervention to address the above impairments. Continue treatment per established plan of care. to address goals. Recommendation for discharge: (in order for the patient to meet his/her long term goals)  Therapy 3 hours per day 5-7 days per week    This discharge recommendation:  Has been made in collaboration with the attending provider and/or case management       SUBJECTIVE:   Patient stated It feels funky.  (using R hand)    OBJECTIVE DATA SUMMARY:   Cognitive/Behavioral Status:  Neurologic State: Alert  Orientation Level: Oriented X4  Cognition: Appropriate decision making; Appropriate for age attention/concentration; Appropriate safety awareness; Follows commands             Functional Mobility and Transfers for ADLs:    Transfers:  Sit to Stand: Contact guard assistance          Balance:  Sitting: Intact  Standing: Impaired  Standing - Static: Fair  Standing - Dynamic : Fair;Poor(initially fair, poor with fatiuge)    ADL Intervention:       Grooming  Brushing Teeth: Contact guard assistance(standing at sink, using RUE primarily) dropped toothbrush and required additional time       Pain:  Patient did not report pain    Activity Tolerance:   VSS    After treatment patient left in no apparent distress:   Sitting in chair, Call bell within reach, and Bed / chair alarm activated    COMMUNICATION/COLLABORATION:   The patients plan of care was discussed with: Physical Therapist, Registered Nurse, and .     Patient was educated regarding His deficit(s) of RUE incoordination, RLE weakness, and impaired balance as this relates to His diagnosis of CVA. He demonstrated Good understanding as evidenced by verbalization. Patient and/or family was verbally educated on the BE FAST acronym for signs/symptoms of CVA and TIA. Provided with BEFAST handout. All questions answered with patient indicating good understanding.      Kimberly Madrigal OT  Time Calculation: 30 mins

## 2019-12-03 NOTE — PROGRESS NOTES
Neurology Progress Note    Patient ID:  Grier Cheadle  [id number]  75 y.o.  1955  HISTORY OF PRESENT ILLNESS 12/02/2019:  The patient is a 33-year-old male with history of hypertension, who was doing well until yesterday evening when he started to notice some weakness in the right lower extremity and it just gave out. He was also not feeling well and had some tingling sensation in his right hand. He laid back down but symptoms did not improve, and he was taken to [facility]. From there, he was transferred to [facility] because there was a small left-sided basal ganglia bleed. He still has right lower extremity weakness and incoordination of the right upper extremity, but denies any changes in speech. No headache, changes in vision or swallowing ability. No previous history of stroke. He has been told in the past that he may have white matter changes in his brain and has seen a neurologist.  He does measure his blood pressure routinely and sometimes, it is elevated. Subjective: On events overnight . He still having some right hand tingling. Pending CT of the head. Objective:    All records in [facility] reviewed and noted    ROS:  Per HPI  All other 12 pt ROS negative    Meds:  Current Facility-Administered Medications   Medication Dose Route Frequency    ondansetron (ZOFRAN) injection 4 mg  4 mg IntraVENous Q6H PRN    labetalol (NORMODYNE;TRANDATE) injection 10 mg  10 mg IntraVENous Q15MIN PRN    Or    hydrALAZINE (APRESOLINE) 20 mg/mL injection 10 mg  10 mg IntraVENous Q15MIN PRN    naloxone (NARCAN) injection 0.4 mg  0.4 mg IntraVENous PRN    acetaminophen (TYLENOL) tablet 650 mg  650 mg Oral Q4H PRN    Or    acetaminophen (TYLENOL) solution 650 mg  650 mg Per NG tube Q4H PRN    Or    acetaminophen (TYLENOL) suppository 650 mg  650 mg Rectal Q4H PRN    acetaminophen (TYLENOL) tablet 650 mg  650 mg Oral Q6H PRN    lamoTRIgine (LaMICtal) tablet 150 mg 150 mg Oral BID    losartan (COZAAR) tablet 100 mg  100 mg Oral DAILY    lithium carbonate capsule 600 mg  600 mg Oral QHS    sertraline (ZOLOFT) tablet 50 mg  50 mg Oral DAILY       Imaging:    Lab Review   No results found for this or any previous visit (from the past 24 hour(s)). Exam:  Visit Vitals  /81 (BP 1 Location: Left arm, BP Patient Position: At rest)   Pulse 67   Temp 98.1 °F (36.7 °C)   Resp 12   Ht 5' 7\" (1.702 m)   Wt 89.3 kg (196 lb 13.9 oz)   SpO2 98%   BMI 30.83 kg/m²     Gen: Well developed  CV: RRR  Lungs: non labored breathing  Abd: non distending  Neuro: A&O x 3, no dysarthria or aphasia  CN II-XII: PERRL, EOMI, face symmetric, tongue/palate midline  Motor: trace weakness in the right lower  extremity at 4+/5. 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  If normal okay for D/C  -SBP <140  - .2, Will order a high dose statin     Signed:  Elli Cook NP  12/3/2019  10:04 AM

## 2019-12-04 PROCEDURE — 65270000032 HC RM SEMIPRIVATE

## 2019-12-04 PROCEDURE — 77010033678 HC OXYGEN DAILY

## 2019-12-04 PROCEDURE — 97116 GAIT TRAINING THERAPY: CPT

## 2019-12-04 PROCEDURE — 97112 NEUROMUSCULAR REEDUCATION: CPT

## 2019-12-04 PROCEDURE — 94760 N-INVAS EAR/PLS OXIMETRY 1: CPT

## 2019-12-04 PROCEDURE — 74011250637 HC RX REV CODE- 250/637: Performed by: NURSE PRACTITIONER

## 2019-12-04 PROCEDURE — 74011250637 HC RX REV CODE- 250/637: Performed by: HOSPITALIST

## 2019-12-04 RX ADMIN — LAMOTRIGINE 150 MG: 100 TABLET ORAL at 17:46

## 2019-12-04 RX ADMIN — SERTRALINE HYDROCHLORIDE 50 MG: 50 TABLET ORAL at 08:16

## 2019-12-04 RX ADMIN — LOSARTAN POTASSIUM 100 MG: 50 TABLET, FILM COATED ORAL at 08:16

## 2019-12-04 RX ADMIN — LITHIUM CARBONATE 600 MG: 300 CAPSULE, GELATIN COATED ORAL at 21:03

## 2019-12-04 RX ADMIN — ACETAMINOPHEN 650 MG: 325 TABLET ORAL at 01:36

## 2019-12-04 RX ADMIN — ATORVASTATIN CALCIUM 40 MG: 40 TABLET, FILM COATED ORAL at 21:03

## 2019-12-04 RX ADMIN — LAMOTRIGINE 150 MG: 100 TABLET ORAL at 08:16

## 2019-12-04 NOTE — PROGRESS NOTES
Bedside shift change report given to Dustin Olivarez RN (oncoming nurse) by Hafsa Mancia (offgoing nurse). Report included the following information SBAR, Intake/Output, Recent Results and Cardiac Rhythm NSR.

## 2019-12-04 NOTE — PROGRESS NOTES
Bedside shift change report given to Judson Coelho RN (oncoming nurse) by Essie Jensen RN (offgoing nurse). Report included the following information SBAR, Kardex, Intake/Output, MAR, Recent Results and Cardiac Rhythm NSR.

## 2019-12-04 NOTE — PROGRESS NOTES
Problem: Self Care Deficits Care Plan (Adult)  Goal: *Acute Goals and Plan of Care (Insert Text)  Description    FUNCTIONAL STATUS PRIOR TO ADMISSION: Patient was independent and active without use of DME.     HOME SUPPORT: The patient lived with wife but did not require assist.    Occupational Therapy Goals  Initiated 12/2/2019  1. Patient will perform grooming standing at sink using BUE for FM/GM tasks with independence within 7 day(s). 2.  Patient will perform bathing with independence within 7 day(s). 3.  Patient will perform lower body dressing with independence within 7 day(s). 4.  Patient will perform toilet transfers with independence within 7 day(s). 5.  Patient will perform all aspects of toileting with independence within 7 day(s). 6.  Patient will participate in upper extremity therapeutic exercise/activities with independence for 5 minutes within 7 day(s). 7.  Patient will utilize energy conservation techniques during functional activities with verbal cues within 7 day(s). Outcome: Progressing Towards Goal     OCCUPATIONAL THERAPY TREATMENT  Patient: Levi Avila (85 y.o. male)  Date: 12/4/2019  Diagnosis: Hemorrhagic stroke ([facility] [I61.9]   <principal problem not specified>       Precautions:  fall  Chart, occupational therapy assessment, plan of care, and goals were reviewed. ASSESSMENT  Patient continues with skilled OT services and is progressing towards goals. Patient demonstrates mildly impaired R hand sensation to touch with tingling, mild RUE impaired gross and fine motor coordination, and mild R hand weakness (MMT 4+/5) but all improving. Patient practiced a variety of gross and fine motor tasks using RUE and BUE w/ integration. Also provided with foam block and was instructed in exercises to isolate/ strengthen grasps. Noted patient required CGA mobilizing in physical therapy with unsteady gait and poor insight, remaining at risk for falls.   Patient was receptive to eduction on neuroplasticity/ importance of functional task practice as well as BEFAST signs/ symptoms of CVA. Continued to recommend inpatient rehab to maximize functional/ neurological recovery. Current Level of Function Impacting Discharge (ADLs): up to contact guard assistance         PLAN :  Patient continues to benefit from skilled intervention to address the above impairments. Continue treatment per established plan of care. to address goals. Recommendation for discharge: (in order for the patient to meet his/her long term goals)  Therapy 3 hours per day 5-7 days per week    This discharge recommendation:  Has been made in collaboration with the attending provider and/or case management       SUBJECTIVE:   Patient stated [facility] [right] hand is getting better.     OBJECTIVE DATA SUMMARY:   Cognitive/Behavioral Status:  Neurologic State: Alert  Orientation Level: Oriented X4  Cognition: Appropriate decision making; Appropriate for age attention/concentration; Appropriate safety awareness; Follows commands             Functional Mobility and Transfers for ADLs: (per physical therapy)  Bed Mobility:  Supine to Sit: (in chair)  Sit to Supine: (in chair)    Transfers:  Sit to Stand: Contact guard assistance          Balance:  Sitting: Intact  Standing: Impaired; With support  Standing - Static: Fair;Constant support  Standing - Dynamic : Fair;Constant support    Neuro Re-Education:   Patient practiced a variety of gross and fine motor tasks using RUE and BUE w/ integration: finger to nose at accelerated pace with moving target, simulated piano exercises, pinching/ relocating small item on table, manipulating head phone/ phone connections, de-tangling headphone wires, and manipulating/ inserting headphone. Also provided with foam block and was instructed in exercises to isolate/ strengthen grasps.       Pain:  Patient did not report pain    Activity Tolerance:   VSS    After treatment patient left in no apparent distress:   Sitting in chair, Call bell within reach, and Caregiver / family present    COMMUNICATION/COLLABORATION:   The patients plan of care was discussed with: Physical Therapist and Registered Nurse. Patient was educated regarding His deficit(s) of RUE incoordination and impaired balance as this relates to His diagnosis of CVA. He demonstrated Good understanding as evidenced by verbalization. Patient and/or family was verbally educated on the BE FAST acronym for signs/symptoms of CVA and TIA. Provided with BEFAST handout. All questions answered with patient indicating good understanding.         Brigido Kruse OT  Time Calculation: 25 mins

## 2019-12-04 NOTE — ROUTINE PROCESS
TRANSFER - OUT REPORT: 
 
Verbal report given to [facility] (name) on Pedro Pfeiffer  being transferred to  (unit) for routine progression of care Report consisted of patients Situation, Background, Assessment and  
Recommendations(SBAR). Information from the following report(s) SBAR, Kardex, Intake/Output, MAR, Accordion, Recent Results and Cardiac Rhythm NSR was reviewed with the receiving nurse. Lines:  
Peripheral IV 12/02/19 Right Hand (Active) Site Assessment Clean, dry, & intact 12/4/2019 12:00 PM  
Phlebitis Assessment 0 12/4/2019 12:00 PM  
Infiltration Assessment 0 12/4/2019 12:00 PM  
Dressing Status Clean, dry, & intact 12/4/2019 12:00 PM  
Dressing Type Tape;Transparent 12/4/2019 12:00 PM  
Hub Color/Line Status Green 12/4/2019 12:00 PM  
Action Taken Open ports on tubing capped 12/4/2019 12:00 PM  
Alcohol Cap Used Yes 12/4/2019 12:00 PM  
   
Peripheral IV 12/02/19 Right Antecubital (Active) Site Assessment Clean, dry, & intact 12/4/2019 12:00 PM  
Phlebitis Assessment 0 12/4/2019 12:00 PM  
Infiltration Assessment 0 12/4/2019 12:00 PM  
Dressing Status Clean, dry, & intact 12/4/2019 12:00 PM  
Dressing Type Tape;Transparent 12/4/2019 12:00 PM  
Hub Color/Line Status Green 12/4/2019 12:00 PM  
Action Taken Open ports on tubing capped 12/4/2019 12:00 PM  
Alcohol Cap Used Yes 12/4/2019 12:00 PM  
  
 
Opportunity for questions and clarification was provided.    
 
Patient transported with: 
 Rn

## 2019-12-04 NOTE — PROGRESS NOTES
Hospitalist Progress Note      Hospital summary: 75 y.o man w/ HTN who presented to the [facility] ED with acute-onset right leg weakness. Symptoms began around 1 AM when he felt his right leg was heavy and he could not bear weight on it. Associated symptoms include right hand paresthesias. No headache, vision changes, n/v. No similar symptoms in the past. He reports compliance with his antihypertensives. He denies taking aspirin or anticoagulants. 12/2/2019      Assessment/Plan:  Acute left basal ganglia hemorrhagic stroke: (POA)   - CT head: Acute parenchymal hemorrhage in the left basal ganglia/left corona radiata, likely hypertensive in etiology  -neurology: likely hypertensive hemorrhagic stroke in his left basal ganglia causing his right-sided symptoms  - neuro surgery : no surgery  - rpt CT head: stable  - no anti platelets for at least 1 month   - .2,  started on statin      HTN: BP stable. C/w losartan  Mood disorder: c/w home meds lithium, Lamictal, zoloft      DVT ppx: SCDs  Code status: full  Disposition: PT/OT recs IPR. Medically stable. Waiting for insurance auth  ----------------------------------------------    CC: right side weakness    S: Patient is seen and examined at bedside. Still has right side weakness but improving. Waiting for placement     Review of Systems:  A comprehensive review of systems was negative.     O:  Visit Vitals  /60 (BP 1 Location: Right arm, BP Patient Position: Sitting)   Pulse 69   Temp 97.7 °F (36.5 °C)   Resp 24   Ht 5' 7\" (1.702 m)   Wt 90 kg (198 lb 6.6 oz)   SpO2 98%   BMI 31.08 kg/m²       PHYSICAL EXAM:  Gen: NAD  HEENT: anicteric sclerae, normal conjunctiva, oropharynx clear, MM moist  Neck: supple, trachea midline, no adenopathy  Heart: RRR, no MRG, no JVD, no peripheral edema  Lungs: CTA b/l, non-labored respirations  Abd: soft, NT, ND, BS+, no organomegaly  Extr: warm  Skin: dry, no rash  Neuro: CN II-XII grossly intact, normal speech, moves all extremities. Mild right leg weakness 4/5   Psych: normal mood, appropriate affect      Intake/Output Summary (Last 24 hours) at 12/4/2019 1631  Last data filed at 12/3/2019 2329  Gross per 24 hour   Intake 700 ml   Output    Net 700 ml        Recent labs & imaging reviewed:  No results found for this or any previous visit (from the past 24 hour(s)). Recent Labs     12/02/19 0251   WBC 7.3   HGB 15.0   HCT 43.2        Recent Labs     12/02/19 0443 12/02/19 0251    135*   K 4.5 4.3    101   CO2 25 26   BUN 22* 22*   CREA 1.05 1.14   * 93   CA 9.0 9.1     Recent Labs     12/02/19 0251   SGOT 13*   ALT 27   AP 84   TBILI 0.3   TP 7.2   ALB 3.8   GLOB 3.4     Recent Labs     12/02/19 0443   INR 1.0   PTP 9.8   APTT 26.6      No results for input(s): FE, TIBC, PSAT, FERR in the last 72 hours. No results found for: FOL, RBCF   No results for input(s): PH, PCO2, PO2 in the last 72 hours. No results for input(s): CPK, CKNDX, TROIQ in the last 72 hours.     No lab exists for component: CPKMB  Lab Results   Component Value Date/Time    Cholesterol, total 228 (H) 12/02/2019 04:43 AM    HDL Cholesterol 58 12/02/2019 04:43 AM    LDL, calculated 109.2 (H) 12/02/2019 04:43 AM    Triglyceride 304 (H) 12/02/2019 04:43 AM    CHOL/HDL Ratio 3.9 12/02/2019 04:43 AM     Lab Results   Component Value Date/Time    Glucose (POC) 90 12/02/2019 02:33 AM     No results found for: COLOR, APPRN, SPGRU, REFSG, GARETH, PROTU, GLUCU, KETU, BILU, UROU, JANET, LEUKU, GLUKE, EPSU, BACTU, WBCU, RBCU, CASTS, UCRY    Med list reviewed  Current Facility-Administered Medications   Medication Dose Route Frequency    atorvastatin (LIPITOR) tablet 40 mg  40 mg Oral QHS    ondansetron (ZOFRAN) injection 4 mg  4 mg IntraVENous Q6H PRN    labetalol (NORMODYNE;TRANDATE) injection 10 mg  10 mg IntraVENous Q15MIN PRN    Or    hydrALAZINE (APRESOLINE) 20 mg/mL injection 10 mg  10 mg IntraVENous Q15MIN PRN    naloxone (NARCAN) injection 0.4 mg  0.4 mg IntraVENous PRN    acetaminophen (TYLENOL) tablet 650 mg  650 mg Oral Q4H PRN    Or    acetaminophen (TYLENOL) solution 650 mg  650 mg Per NG tube Q4H PRN    Or    acetaminophen (TYLENOL) suppository 650 mg  650 mg Rectal Q4H PRN    acetaminophen (TYLENOL) tablet 650 mg  650 mg Oral Q6H PRN    lamoTRIgine (LaMICtal) tablet 150 mg  150 mg Oral BID    losartan (COZAAR) tablet 100 mg  100 mg Oral DAILY    lithium carbonate capsule 600 mg  600 mg Oral QHS    sertraline (ZOLOFT) tablet 50 mg  50 mg Oral DAILY       Care Plan discussed with:  Patient/Family, Nurse and     Asad King MD  Internal Medicine  Date of Service: 12/4/2019

## 2019-12-04 NOTE — PROGRESS NOTES
Problem: Hemorrhagic Stroke: Day 3  Goal: Activity/Safety  Outcome: Progressing Towards Goal   Patient ambulating around RN station with gait belt assistance.

## 2019-12-04 NOTE — PROGRESS NOTES
Hospitalist Progress Note      Hospital summary: 75 y.o man w/ HTN who presented to the [place] ED with acute-onset right leg weakness. Symptoms began around 1 AM when he felt his right leg was heavy and he could not bear weight on it. Associated symptoms include right hand paresthesias. No headache, vision changes, n/v. No similar symptoms in the past. He reports compliance with his antihypertensives. He denies taking aspirin or anticoagulants. 12/2/2019      Assessment/Plan:  Acute left basal ganglia hemorrhagic stroke: (POA)   -neurochecks  - CT head: Acute parenchymal hemorrhage in the left basal ganglia/left corona radiata, likely hypertensive in etiology  -neurology: likely hypertensive hemorrhagic stroke in his left basal ganglia causing his right-sided symptoms  - neuro surgery : no surgery  - rpt CT head: stable  - no anti platelets for at least 1 month   - .2,  started on statin      HTN:  - BP stable. C/w losartan    Mood disorder: c/w home meds lithium, Lamictal, zoloft      DVT ppx: SCDs  Code status: full  Disposition: PT/OT recs IPR. Possible dc tomorrow. Medically stable.   ----------------------------------------------    CC: right side weakness    S: Patient is seen and examined at bedside. Family present. Still has right side weakness but improving     Review of Systems:  A comprehensive review of systems was negative.     O:  Visit Vitals  /56 (BP 1 Location: Left arm, BP Patient Position: At rest)   Pulse 72   Temp 98.5 °F (36.9 °C)   Resp 15   Ht 5' 7\" (1.702 m)   Wt 89.3 kg (196 lb 13.9 oz)   SpO2 99%   BMI 30.83 kg/m²       PHYSICAL EXAM:  Gen: NAD  HEENT: anicteric sclerae, normal conjunctiva, oropharynx clear, MM moist  Neck: supple, trachea midline, no adenopathy  Heart: RRR, no MRG, no JVD, no peripheral edema  Lungs: CTA b/l, non-labored respirations  Abd: soft, NT, ND, BS+, no organomegaly  Extr: warm  Skin: dry, no rash  Neuro: CN II-XII grossly intact, normal speech, moves all extremities. Mild right leg weakness 4/5   Psych: normal mood, appropriate affect    No intake or output data in the 24 hours ending 12/03/19 1903     Recent labs & imaging reviewed:  No results found for this or any previous visit (from the past 24 hour(s)). Recent Labs     12/02/19 0251   WBC 7.3   HGB 15.0   HCT 43.2        Recent Labs     12/02/19 0443 12/02/19 0251    135*   K 4.5 4.3    101   CO2 25 26   BUN 22* 22*   CREA 1.05 1.14   * 93   CA 9.0 9.1     Recent Labs     12/02/19 0251   SGOT 13*   ALT 27   AP 84   TBILI 0.3   TP 7.2   ALB 3.8   GLOB 3.4     Recent Labs     12/02/19 0443   INR 1.0   PTP 9.8   APTT 26.6      No results for input(s): FE, TIBC, PSAT, FERR in the last 72 hours. No results found for: FOL, RBCF   No results for input(s): PH, PCO2, PO2 in the last 72 hours. No results for input(s): CPK, CKNDX, TROIQ in the last 72 hours.     No lab exists for component: CPKMB  Lab Results   Component Value Date/Time    Cholesterol, total 228 (H) 12/02/2019 04:43 AM    HDL Cholesterol 58 12/02/2019 04:43 AM    LDL, calculated 109.2 (H) 12/02/2019 04:43 AM    Triglyceride 304 (H) 12/02/2019 04:43 AM    CHOL/HDL Ratio 3.9 12/02/2019 04:43 AM     Lab Results   Component Value Date/Time    Glucose (POC) 90 12/02/2019 02:33 AM     No results found for: COLOR, APPRN, SPGRU, REFSG, GARETH, PROTU, GLUCU, KETU, BILU, UROU, JANET, LEUKU, GLUKE, EPSU, BACTU, WBCU, RBCU, CASTS, UCRY    Med list reviewed  Current Facility-Administered Medications   Medication Dose Route Frequency    atorvastatin (LIPITOR) tablet 40 mg  40 mg Oral QHS    ondansetron (ZOFRAN) injection 4 mg  4 mg IntraVENous Q6H PRN    labetalol (NORMODYNE;TRANDATE) injection 10 mg  10 mg IntraVENous Q15MIN PRN    Or    hydrALAZINE (APRESOLINE) 20 mg/mL injection 10 mg  10 mg IntraVENous Q15MIN PRN    naloxone (NARCAN) injection 0.4 mg  0.4 mg IntraVENous PRN    acetaminophen (TYLENOL) tablet 650 mg  650 mg Oral Q4H PRN    Or    acetaminophen (TYLENOL) solution 650 mg  650 mg Per NG tube Q4H PRN    Or    acetaminophen (TYLENOL) suppository 650 mg  650 mg Rectal Q4H PRN    acetaminophen (TYLENOL) tablet 650 mg  650 mg Oral Q6H PRN    lamoTRIgine (LaMICtal) tablet 150 mg  150 mg Oral BID    losartan (COZAAR) tablet 100 mg  100 mg Oral DAILY    lithium carbonate capsule 600 mg  600 mg Oral QHS    sertraline (ZOLOFT) tablet 50 mg  50 mg Oral DAILY       Care Plan discussed with:  Patient/Family, Nurse and     Fabby Graves MD  Internal Medicine  Date of Service: 12/3/2019

## 2019-12-04 NOTE — PROGRESS NOTES
Problem: Mobility Impaired (Adult and Pediatric)  Goal: *Acute Goals and Plan of Care (Insert Text)  Description  FUNCTIONAL STATUS PRIOR TO ADMISSION: Patient was independent and active without use of DME.    HOME SUPPORT PRIOR TO ADMISSION: The patient lived with wife but did not require assist.    Physical Therapy Goals  Initiated 12/2/2019  1. Patient will move from supine to sit and sit to supine , scoot up and down and roll side to side in bed with modified independence within 7 day(s). 2.  Patient will transfer from bed to chair and chair to bed with contact guard assist using the least restrictive device within 7 day(s). 3.  Patient will perform sit to stand with contact guard assist within 7 day(s). 4.  Patient will ambulate with minimal assistance/contact guard assist for 150 feet with the least restrictive device within 7 day(s). 5.  Patient will ascend/descend 6 stairs with single handrail(s) with minimal assistance/contact guard assist within 7 day(s). Outcome: Progressing Towards Goal  PHYSICAL THERAPY TREATMENT  Patient: Roel Keita (20 y.o. male)  Date: 12/4/2019  Diagnosis: Hemorrhagic stroke ([facility] [I61.9]   <principal problem not specified>       Precautions:    Chart, physical therapy assessment, plan of care and goals were reviewed. ASSESSMENT  Patient is making great progress toward mobility goals, although continues to present with impaired balance, impaired cognition, decreased safety awareness, and unsteady gait. He transferred with CGA and ambulated with CGA. Pt reaching out for external support due to overall unsteadiness, but no overt LOB or buckling noted. Pt did have one episode where he spontaneously attempted to perform a deep squat mid ambulation causing him to loose his balance and required assist to return to upright standing position.   Pt continues to require education and verbal cuing for safety awareness and decision making due to impulsivity and poor insight into deficits. Continuing to recommend IP rehab upon discharge. Current Level of Function Impacting Discharge (mobility/balance): CGA for transfers and mobility     Other factors to consider for discharge: impaired cognition, impulsivity, decreased safety awareness, independent baseline, stairs at home         PLAN :  Patient continues to benefit from skilled intervention to address the above impairments. Continue treatment per established plan of care. to address goals. Recommendation for discharge: (in order for the patient to meet his/her long term goals)  Therapy 3 hours per day 5-7 days per week    This discharge recommendation:  Has been made in collaboration with the attending provider and/or case management    IF patient discharges home will need the following DME: none       SUBJECTIVE:   Patient stated I was trying to do a deep knee squat.     OBJECTIVE DATA SUMMARY:   Critical Behavior:  Neurologic State: Alert  Orientation Level: Oriented X4  Cognition: Appropriate decision making, Appropriate for age attention/concentration, Appropriate safety awareness, Follows commands  Safety/Judgement: Awareness of environment, Decreased awareness of need for assistance, Decreased awareness of need for safety, Decreased insight into deficits, Fall prevention  Functional Mobility Training:  Bed Mobility:     Supine to Sit: (in chair)  Sit to Supine: (in chair)           Transfers:  Sit to Stand: Contact guard assistance  Stand to Sit: Contact guard assistance                             Balance:  Sitting: Intact  Standing: Impaired; With support  Standing - Static: Fair;Constant support  Standing - Dynamic : Fair;Constant support  Ambulation/Gait Training:  Distance (ft): 75 Feet (ft)(x several reps)  Assistive Device: Gait belt  Ambulation - Level of Assistance: Contact guard assistance        Gait Abnormalities: Decreased step clearance;Shuffling gait;Trunk sway increased        Base of Support: Widened     Speed/Amarilys: Shuffled; Slow  Step Length: Left shortened;Right shortened      Activity Tolerance:   Good  Please refer to the flowsheet for vital signs taken during this treatment.     After treatment patient left in no apparent distress:   Sitting in chair, Call bell within reach, and Caregiver / family present    COMMUNICATION/COLLABORATION:   The patients plan of care was discussed with: Occupational Therapist and Registered Nurse    Janet Vela, PT, DPT   Time Calculation: 9 mins

## 2019-12-04 NOTE — ROUTINE PROCESS
TRANSFER - IN REPORT: 
 
Verbal report received from joan rn(name) on Zan Grigsby  being received from nstu(unit) for routine progression of care Report consisted of patients Situation, Background, Assessment and  
Recommendations(SBAR). Information from the following report(s) SBAR and Kardex was reviewed with the receiving nurse. Opportunity for questions and clarification was provided. Assessment completed upon patients arrival to unit and care assumed.

## 2019-12-04 NOTE — PROGRESS NOTES
Update 15:40PM-Spoke with Munira Chowd[facility]ia[facility] to check status. Insurance authorization still pending. Informed family at bedside. CM to follow. ERIN Smith,JAMES      TORRES:    Patient is ready for discharge. Patient accepted to [facility] pending insurance authorization. Facility initiated authorization on yesterday 12/3. CM to follow.  ERIN Smith,CRM

## 2019-12-05 PROCEDURE — 65270000032 HC RM SEMIPRIVATE

## 2019-12-05 PROCEDURE — 97116 GAIT TRAINING THERAPY: CPT

## 2019-12-05 PROCEDURE — 74011250637 HC RX REV CODE- 250/637: Performed by: NURSE PRACTITIONER

## 2019-12-05 PROCEDURE — 74011250637 HC RX REV CODE- 250/637: Performed by: HOSPITALIST

## 2019-12-05 RX ADMIN — SERTRALINE HYDROCHLORIDE 50 MG: 50 TABLET ORAL at 09:27

## 2019-12-05 RX ADMIN — LOSARTAN POTASSIUM 100 MG: 50 TABLET, FILM COATED ORAL at 09:27

## 2019-12-05 RX ADMIN — LAMOTRIGINE 150 MG: 100 TABLET ORAL at 18:00

## 2019-12-05 RX ADMIN — ATORVASTATIN CALCIUM 40 MG: 40 TABLET, FILM COATED ORAL at 21:22

## 2019-12-05 RX ADMIN — LITHIUM CARBONATE 600 MG: 300 CAPSULE, GELATIN COATED ORAL at 21:22

## 2019-12-05 RX ADMIN — LAMOTRIGINE 150 MG: 100 TABLET ORAL at 09:27

## 2019-12-05 NOTE — PROGRESS NOTES
Hospitalist Progress Note      Hospital summary: 75 y.o man w/ HTN who presented to the [facility] with acute-onset right leg weakness. Symptoms began around 1 AM when he felt his right leg was heavy and he could not bear weight on it. Associated symptoms include right hand paresthesias. No headache, vision changes, n/v. No similar symptoms in the past. He reports compliance with his antihypertensives. He denies taking aspirin or anticoagulants. 12/2/2019      Assessment/Plan:  Acute left basal ganglia hemorrhagic stroke: (POA)   - CT head: Acute parenchymal hemorrhage in the left basal ganglia/left corona radiata, likely hypertensive in etiology  -neurology: likely hypertensive hemorrhagic stroke in his left basal ganglia causing his right-sided symptoms  - neuro surgery : no surgery  - rpt CT head: stable  - no anti platelets for at least 1 month   - .2,  on statin      HTN: BP stable. C/w losartan  Mood disorder: c/w home meds lithium, Lamictal, zoloft      DVT ppx: SCDs  Code status: full  Disposition: PT/OT recs IPR. Medically stable. Waiting for insurance auth  ----------------------------------------------    CC: right side weakness    S: Patient is seen and examined at bedside. Still has right side weakness but improving. Patient was transferred to  last night and he had some issues with coordination of care but now he is ok. Waiting for placement     Review of Systems:  A comprehensive review of systems was negative.     O:  Visit Vitals  /77 (BP 1 Location: Left arm, BP Patient Position: At rest)   Pulse 66   Temp 98.1 °F (36.7 °C)   Resp 16   Ht 5' 7\" (1.702 m)   Wt 95.4 kg (210 lb 5.1 oz)   SpO2 96%   BMI 32.94 kg/m²       PHYSICAL EXAM:  Gen: NAD  HEENT: anicteric sclerae, normal conjunctiva, oropharynx clear, MM moist  Neck: supple, trachea midline, no adenopathy  Heart: RRR, no MRG, no JVD, no peripheral edema  Lungs: CTA b/l, non-labored respirations  Abd: soft, NT, ND, BS+, no organomegaly  Extr: warm  Skin: dry, no rash  Neuro: CN II-XII grossly intact, normal speech, moves all extremities. Mild right leg weakness 4/5   Psych: normal mood, appropriate affect      Intake/Output Summary (Last 24 hours) at 12/5/2019 1337  Last data filed at 12/5/2019 1043  Gross per 24 hour   Intake 600 ml   Output    Net 600 ml        Recent labs & imaging reviewed:  No results found for this or any previous visit (from the past 24 hour(s)). No results for input(s): WBC, HGB, HCT, PLT, HGBEXT, HCTEXT, PLTEXT, HGBEXT, HCTEXT, PLTEXT in the last 72 hours. No results for input(s): NA, K, CL, CO2, BUN, CREA, GLU, CA, MG, PHOS, URICA in the last 72 hours. No results for input(s): SGOT, GPT, ALT, AP, TBIL, TBILI, TP, ALB, GLOB, GGT, AML, LPSE in the last 72 hours. No lab exists for component: AMYP, HLPSE  No results for input(s): INR, PTP, APTT, INREXT, INREXT in the last 72 hours. No results for input(s): FE, TIBC, PSAT, FERR in the last 72 hours. No results found for: FOL, RBCF   No results for input(s): PH, PCO2, PO2 in the last 72 hours. No results for input(s): CPK, CKNDX, TROIQ in the last 72 hours.     No lab exists for component: CPKMB  Lab Results   Component Value Date/Time    Cholesterol, total 228 (H) 12/02/2019 04:43 AM    HDL Cholesterol 58 12/02/2019 04:43 AM    LDL, calculated 109.2 (H) 12/02/2019 04:43 AM    Triglyceride 304 (H) 12/02/2019 04:43 AM    CHOL/HDL Ratio 3.9 12/02/2019 04:43 AM     Lab Results   Component Value Date/Time    Glucose (POC) 90 12/02/2019 02:33 AM     No results found for: COLOR, APPRN, SPGRU, REFSG, GARETH, PROTU, GLUCU, KETU, BILU, UROU, JANET, LEUKU, GLUKE, EPSU, BACTU, WBCU, RBCU, CASTS, UCRY    Med list reviewed  Current Facility-Administered Medications   Medication Dose Route Frequency    atorvastatin (LIPITOR) tablet 40 mg  40 mg Oral QHS    ondansetron (ZOFRAN) injection 4 mg  4 mg IntraVENous Q6H PRN    labetalol (NORMODYNE;TRANDATE) injection 10 mg  10 mg IntraVENous Q15MIN PRN    Or    hydrALAZINE (APRESOLINE) 20 mg/mL injection 10 mg  10 mg IntraVENous Q15MIN PRN    naloxone (NARCAN) injection 0.4 mg  0.4 mg IntraVENous PRN    acetaminophen (TYLENOL) tablet 650 mg  650 mg Oral Q4H PRN    Or    acetaminophen (TYLENOL) solution 650 mg  650 mg Per NG tube Q4H PRN    Or    acetaminophen (TYLENOL) suppository 650 mg  650 mg Rectal Q4H PRN    acetaminophen (TYLENOL) tablet 650 mg  650 mg Oral Q6H PRN    lamoTRIgine (LaMICtal) tablet 150 mg  150 mg Oral BID    losartan (COZAAR) tablet 100 mg  100 mg Oral DAILY    lithium carbonate capsule 600 mg  600 mg Oral QHS    sertraline (ZOLOFT) tablet 50 mg  50 mg Oral DAILY       Care Plan discussed with:  Patient/Family, Nurse and     Juju Friend MD  Internal Medicine  Date of Service: 12/5/2019

## 2019-12-05 NOTE — ROUTINE PROCESS
Bedside shift change report given to Camacho (oncoming nurse) by Stanford Sanders (offgoing nurse). Report included the following information SBAR, Kardex, Intake/Output, MAR and Recent Results.

## 2019-12-05 NOTE — PROGRESS NOTES
Problem: Falls - Risk of  Goal: *Absence of Falls  Description  Document [facility] Fall Risk and appropriate interventions in the flowsheet.   Outcome: Progressing Towards Goal  Note: Fall Risk Interventions:  Mobility Interventions: Utilize walker, cane, or other assistive device         Medication Interventions: Bed/chair exit alarm    Elimination Interventions: Bed/chair exit alarm, Call light in reach              Problem: Patient Education: Go to Patient Education Activity  Goal: Patient/Family Education  Outcome: Progressing Towards Goal

## 2019-12-05 NOTE — PROGRESS NOTES
Problem: Mobility Impaired (Adult and Pediatric)  Goal: *Acute Goals and Plan of Care (Insert Text)  Description  FUNCTIONAL STATUS PRIOR TO ADMISSION: Patient was independent and active without use of DME.    HOME SUPPORT PRIOR TO ADMISSION: The patient lived with wife but did not require assist.    Physical Therapy Goals  Initiated 12/2/2019  1. Patient will move from supine to sit and sit to supine , scoot up and down and roll side to side in bed with modified independence within 7 day(s). 2.  Patient will transfer from bed to chair and chair to bed with contact guard assist using the least restrictive device within 7 day(s). 3.  Patient will perform sit to stand with contact guard assist within 7 day(s). 4.  Patient will ambulate with minimal assistance/contact guard assist for 150 feet with the least restrictive device within 7 day(s). 5.  Patient will ascend/descend 6 stairs with single handrail(s) with minimal assistance/contact guard assist within 7 day(s). Outcome: Progressing Towards Goal  PHYSICAL THERAPY TREATMENT  Patient: Sarika Altman (71 y.o. male)  Date: 12/5/2019  Diagnosis: [facility] [I61.9]   <principal problem not specified>       Precautions:  fall  Chart, physical therapy assessment, plan of care and goals were reviewed. ASSESSMENT  Patient continues with skilled PT services and is progressing towards goals. Pt presenting with impaired balance, impaired cognition, decreased safety awareness, unsteady gait - fall risk. Current Level of Function Impacting Discharge (mobility/balance): Min to CGA for ambulation, occ assist secondary to loss of balance/right leg instability/incoodination    Other factors to consider for discharge:          PLAN :  Patient continues to benefit from skilled intervention to address the above impairments. Continue treatment per established plan of care. to address goals.     Recommendation for discharge: (in order for the patient to meet his/her long term goals)  Therapy 3 hours per day 5-7 days per week    This discharge recommendation:  Has been made in collaboration with the attending provider and/or case management    IF patient discharges home will need the following DME: to be determined (TBD)       SUBJECTIVE:   Patient stated My right leg is just weak.     OBJECTIVE DATA SUMMARY:   Critical Behavior:  Neurologic State: Alert  Orientation Level: Oriented X4  Cognition: Follows commands  Safety/Judgement: Awareness of environment, Decreased awareness of need for assistance, Decreased awareness of need for safety, Decreased insight into deficits, Fall prevention  Functional Mobility Training:  Bed Mobility:     Supine to Sit: Modified independent  Sit to Supine: Modified independent  Scooting: Stand-by assistance        Transfers:  Sit to Stand: Contact guard assistance  Stand to Sit: Contact guard assistance        Bed to Chair: Minimum assistance                    Balance:  Sitting: Intact; Without support  Standing: Impaired; Without support  Standing - Static: Fair;Constant support  Standing - Dynamic : Fair;Constant support  Ambulation/Gait Training:  Distance (ft): 75 Feet (ft)(x 2)  Assistive Device: Gait belt  Ambulation - Level of Assistance: Contact guard assistance(min assist x 4 to steady balance)        Gait Abnormalities: Decreased step clearance;Trunk sway increased(instability right knee occ)        Base of Support: Widened  Stance: Right decreased  Speed/Amarilys: Slow;Shuffled  Step Length: Right shortened;Left shortened  Swing Pattern: Right asymmetrical     Pain Rating:  Pt denied pain. Activity Tolerance:   Fair  Please refer to the flowsheet for vital signs taken during this treatment.     After treatment patient left in no apparent distress:   Supine in bed, Call bell within reach and Bed / chair alarm activated    COMMUNICATION/COLLABORATION:   The patients plan of care was discussed with: Registered Nurse    Maxim Gomez, PT   Time Calculation: 18 mins

## 2019-12-05 NOTE — PROGRESS NOTES
Transition of Care Plan    1. Waiting for authorization from insurance for inpatient rehab-- [facility] Janelle castillo CM talked with liaison, Brian Chun [phone] and she has submitted all medicals to the [facility] [address] [place], [place] [address]    2. CM will complete [facility] when authorized. 3. Patient will need ambulance transport. Impaired balance, cognition and decreased safety awareness and unsteady gait. Hemorrhagic stroke.       Note  Prior to admission, patient lived at home with his wife, Elizabet Weaver [phone] and was self care

## 2019-12-06 VITALS
BODY MASS INDEX: 32.87 KG/M2 | SYSTOLIC BLOOD PRESSURE: 147 MMHG | OXYGEN SATURATION: 98 % | HEART RATE: 75 BPM | WEIGHT: 209.4 LBS | TEMPERATURE: 98.3 F | RESPIRATION RATE: 18 BRPM | HEIGHT: 67 IN | DIASTOLIC BLOOD PRESSURE: 76 MMHG

## 2019-12-06 PROCEDURE — 97530 THERAPEUTIC ACTIVITIES: CPT

## 2019-12-06 PROCEDURE — 74011250637 HC RX REV CODE- 250/637: Performed by: NURSE PRACTITIONER

## 2019-12-06 PROCEDURE — 97116 GAIT TRAINING THERAPY: CPT

## 2019-12-06 PROCEDURE — 74011250637 HC RX REV CODE- 250/637: Performed by: HOSPITALIST

## 2019-12-06 RX ADMIN — SERTRALINE HYDROCHLORIDE 50 MG: 50 TABLET ORAL at 09:34

## 2019-12-06 RX ADMIN — LAMOTRIGINE 150 MG: 100 TABLET ORAL at 09:34

## 2019-12-06 RX ADMIN — LITHIUM CARBONATE 600 MG: 300 CAPSULE, GELATIN COATED ORAL at 21:19

## 2019-12-06 RX ADMIN — ATORVASTATIN CALCIUM 40 MG: 40 TABLET, FILM COATED ORAL at 21:19

## 2019-12-06 RX ADMIN — LOSARTAN POTASSIUM 100 MG: 50 TABLET, FILM COATED ORAL at 09:34

## 2019-12-06 RX ADMIN — LAMOTRIGINE 150 MG: 100 TABLET ORAL at 18:39

## 2019-12-06 NOTE — DISCHARGE SUMMARY
Discharge Summary     Patient:  Penny Connor       MRN: [id number]       YOB: 1955       Age: 75 y.o. Date of admission:  12/2/2019    Date of discharge:  12/6/2019    Primary care provider: Dr. Claritza Ramirez MD    Admitting provider:  Yi Mayo MD    Discharging provider:  Mehul Nuno MD - Office Black[employer]: [phone]. If unavailable, call [phone] and ask the  to page the triage hospitalist.    Consultations  · IP CONSULT TO NEUROLOGY  · IP CONSULT TO NEUROSURGERY  · IP CONSULT TO HOSPITALIST    Procedures  · * No surgery found *    Discharge destination: Inpatient rehab - [facility] .  The patient is stable for discharge. Admission diagnosis  · Hemorrhagic stroke [facility]) [I61.9]    Current Discharge Medication List      START taking these medications    Details   atorvastatin (LIPITOR) 40 mg tablet Take 1 Tab by mouth nightly. Qty: 30 Tab, Refills: 0      Blood Pressure Monitor kit 1 Units by Does Not Apply route daily. Qty: 1 Kit, Refills: 0         CONTINUE these medications which have NOT CHANGED    Details   amphetamine-dextroamphetamine XR (ADDERALL XR) 20 mg XR capsule Take 20 mg by mouth two (2) times a day. lamoTRIgine (LAMICTAL) 150 mg tablet Take 150 mg by mouth two (2) times a day. losartan (COZAAR) 100 mg tablet Take 100 mg by mouth daily. lithium carbonate 600 mg capsule Take 600 mg by mouth nightly. sertraline (ZOLOFT) 50 mg tablet Take 50 mg by mouth daily. STOP taking these medications       meloxicam (MOBIC) 15 mg tablet Comments:   Reason for Stopping:         dextroamphetamine-amphetamine (ADDERALL) 20 mg tablet Comments:   Reason for Stopping:                Follow-up Information     Follow up With Specialties Details Why Contact Info    Claritza Ramirez MD Family Practice In 1 week  [facility] [address] Nikkie Myers MD Neurology   [address] [place] [address]      Gloria Howe MD Neurosurgery   [address]  [phone]            Final discharge diagnoses and brief hospital course  75 y.o man w/ HTN who presented to the [place] ED with acute-onset right leg weakness. Symptoms began around 1 AM when he felt his right leg was heavy and he could not bear weight on it. Associated symptoms include right hand paresthesias. No headache, vision changes, n/v. No similar symptoms in the past. He reports compliance with his antihypertensives. He denies taking aspirin or anticoagulants. 12/2/2019     Acute left basal ganglia hemorrhagic stroke: (POA)   - CT head: Acute parenchymal hemorrhage in the left basal ganglia/left corona radiata, likely hypertensive in etiology  -neurology: likely hypertensive hemorrhagic stroke in his left basal ganglia causing his right-sided symptoms  - neuro surgery : no surgery  - rpt CT head: stable  - no anti platelets for at least 1 month   - .2,  on statin      HTN: BP stable. C/w losartan  Mood disorder: c/w home meds lithium, Lamictal, zoloft     Recommendations:  PCP f/u in 1 week  Rpt CT head in 4-6 weeks  NO ASPIRIN or NSAIDs x 1 month     Physical examination at discharge  Visit Vitals  /83 (BP 1 Location: Right arm, BP Patient Position: At rest;Sitting)   Pulse 67   Temp 98.4 °F (36.9 °C)   Resp 18   Ht 5' 7\" (1.702 m)   Wt 95 kg (209 lb 6.4 oz)   SpO2 95%   BMI 32.80 kg/m²   Gen: NAD  HEENT: anicteric sclerae, normal conjunctiva, oropharynx clear, MM moist  Neck: supple, trachea midline, no adenopathy  Heart: RRR, no MRG, no JVD, no peripheral edema  Lungs: CTA b/l, non-labored respirations  Abd: soft, NT, ND, BS+, no organomegaly  Extr: warm  Skin: dry, no rash  Neuro: CN II-XII grossly intact, normal speech, moves all extremities.    Mild right leg weakness 4/5   Psych: normal mood, appropriate affect          Pertinent imaging studies:    CT head: Acute parenchymal hemorrhage in the left basal ganglia/left corona radiata, likely hypertensive in etiology    No results for input(s): WBC, HGB, HCT, PLT, HGBEXT, HCTEXT, PLTEXT in the last 72 hours. No results for input(s): NA, K, CL, CO2, BUN, CREA, GLU, CA, MG, PHOS, URICA in the last 72 hours. No results for input(s): SGOT, GPT, AP, TBIL, TP, ALB, GLOB, GGT, AML, LPSE in the last 72 hours. No lab exists for component: AMYP, HLPSE  No results for input(s): INR, PTP, APTT, INREXT in the last 72 hours. No results for input(s): FE, TIBC, PSAT, FERR in the last 72 hours. No results for input(s): PH, PCO2, PO2 in the last 72 hours. No results for input(s): CPK, CKMB in the last 72 hours. No lab exists for component: TROPONINI  No components found for: [place]    Chronic Diagnoses:    Problem List as of 12/6/2019 Date Reviewed: 6/30/2014          Codes Class Noted - Resolved    Hemorrhagic stroke [facility]) ICD-10-CM: I61.9  ICD-9-CM: 584  12/2/2019 - Present        SOB (shortness of breath) ICD-10-CM: R06.02  ICD-9-CM: 786.05  6/30/2014 - Present        Chest pain, central ICD-10-CM: R07.9  ICD-9-CM: 786.50  6/30/2014 - Present        H/O tobacco use, presenting hazards to health ICD-10-CM: Z87.891  ICD-9-CM: V15.82  6/30/2014 - Present              Time spent on discharge related activities today greater than 30 minutes.       Signed:  Segundo Handley MD                 Hospitalist, Internal Medicine      Cc: Jackie Muñoz MD

## 2019-12-06 NOTE — PROGRESS NOTES
Patient to leave at 7:30 pm via [facility] to [address] signed by Dr. Teagan Freire at bedside chart. [facility] packet at bedside chart.      RN call to report: [facility]     [phone]

## 2019-12-06 NOTE — PROGRESS NOTES
Problem: Mobility Impaired (Adult and Pediatric)  Goal: *Acute Goals and Plan of Care (Insert Text)  Description  FUNCTIONAL STATUS PRIOR TO ADMISSION: Patient was independent and active without use of DME.    HOME SUPPORT PRIOR TO ADMISSION: The patient lived with wife but did not require assist.    Physical Therapy Goals  Initiated 12/2/2019  1. Patient will move from supine to sit and sit to supine , scoot up and down and roll side to side in bed with modified independence within 7 day(s). 2.  Patient will transfer from bed to chair and chair to bed with contact guard assist using the least restrictive device within 7 day(s). 3.  Patient will perform sit to stand with contact guard assist within 7 day(s). 4.  Patient will ambulate with minimal assistance/contact guard assist for 150 feet with the least restrictive device within 7 day(s). 5.  Patient will ascend/descend 6 stairs with single handrail(s) with minimal assistance/contact guard assist within 7 day(s). Outcome: Progressing Towards Goal  PHYSICAL THERAPY TREATMENT  Patient: Jenni Luo (39 y.o. male)  Date: 12/6/2019  Diagnosis: Hemorrhagic stroke ([facility] [I61.9]   <principal problem not specified>       Precautions:  fall  Chart, physical therapy assessment, plan of care and goals were reviewed. ASSESSMENT  Patient continues with skilled PT services and is progressing towards goals. Pt continues to report difficulty with coordination/strength right leg. Patient participated in standing postural exercises - standing with back against wall. Current Level of Function Impacting Discharge (mobility/balance): CGA to occ min assist x 1    Other factors to consider for discharge:          PLAN :  Patient continues to benefit from skilled intervention to address the above impairments. Continue treatment per established plan of care. to address goals.     Recommendation for discharge: (in order for the patient to meet his/her long term goals)  Therapy 3 hours per day 5-7 days per week    This discharge recommendation:  Has been made in collaboration with the attending provider and/or case management    IF patient discharges home will need the following DME: to be determined (TBD)       SUBJECTIVE:   Patient stated my leg feels so strange.     OBJECTIVE DATA SUMMARY:   Critical Behavior:  Neurologic State: Alert  Orientation Level: Oriented X4  Cognition: Appropriate safety awareness, Follows commands  Safety/Judgement: Awareness of environment, Decreased awareness of need for assistance, Decreased awareness of need for safety, Decreased insight into deficits, Fall prevention  Functional Mobility Training:  Bed Mobility:     Supine to Sit: Modified independent  Sit to Supine: Modified independent  Scooting: Stand-by assistance        Transfers:  Sit to Stand: Contact guard assistance  Stand to Sit: Contact guard assistance        Bed to Chair: Minimum assistance                    Balance:  Sitting: Intact; Without support  Standing: Impaired; Without support  Standing - Static: Fair  Standing - Dynamic : Fair  Ambulation/Gait Training:  Distance (ft): 190 Feet (ft)  Assistive Device: Gait belt  Ambulation - Level of Assistance: Contact guard assistance        Gait Abnormalities: Decreased step clearance;Trunk sway increased        Base of Support: Narrowed(with verbal cues able to widened base of support)  Stance: Right decreased  Speed/Amarilys: Slow  Step Length: Right shortened;Left shortened  Swing Pattern: Right asymmetrical          Therapeutic Exercises/Activities:   Standing with back against wall - placing shoulders/head against wall. Worked on lateral trunk flexion right and left. With assist of therapist worked on trunk rotation. Pain Rating:  Pt denied pain. Activity Tolerance:   Fair  Please refer to the flowsheet for vital signs taken during this treatment.     After treatment patient left in no apparent distress:   Supine in bed and Call bell within reach    COMMUNICATION/COLLABORATION:   The patients plan of care was discussed with: Registered Nurse    Quinton Thomas, PT   Time Calculation: 26 mins

## 2019-12-06 NOTE — PROGRESS NOTES
Stroke Education documented in Patient Education: YES  Core Measures Documented in Connect Care:  Risk Factors: YES  Warning signs of stroke: YES  When to Activate 911: YES  Medication Education for Risk Factors: YES  Smoking cessation if applicable: YES  Written Education Given:  YES    Discharge NIH Completed: YES  Score: 1    BRAINS: YES    Follow Up Appointment Made: No.  Patient being discharged to inpatient rehab.

## 2020-01-30 ENCOUNTER — OFFICE VISIT (OUTPATIENT)
Dept: NEUROLOGY | Age: 65
End: 2020-01-30

## 2020-01-30 VITALS
OXYGEN SATURATION: 98 % | SYSTOLIC BLOOD PRESSURE: 122 MMHG | HEART RATE: 68 BPM | HEIGHT: 67 IN | DIASTOLIC BLOOD PRESSURE: 82 MMHG | BODY MASS INDEX: 32.8 KG/M2 | WEIGHT: 209 LBS | RESPIRATION RATE: 16 BRPM

## 2020-01-30 DIAGNOSIS — R90.82 WHITE MATTER DISEASE: ICD-10-CM

## 2020-01-30 DIAGNOSIS — I61.9 HEMORRHAGIC STROKE (HCC): Primary | ICD-10-CM

## 2020-01-30 NOTE — PATIENT INSTRUCTIONS
PRESCRIPTION REFILL POLICY [facility] Statement to Patients April 1, 2014 In an effort to ensure the large volume of patient prescription refills is processed in the most efficient and expeditious manner, we are asking our patients to assist us by calling your Pharmacy for all prescription refills, this will include also your  Mail Order Pharmacy. The pharmacy will contact our office electronically to continue the refill process. Please do not wait until the last minute to call your pharmacy. We need at least 48 hours (2days) to fill prescriptions. We also encourage you to call your pharmacy before going to  your prescription to make sure it is ready. With regard to controlled substance prescription refill requests (narcotic refills) that need to be picked up at our office, we ask your cooperation by providing us with at least 72 hours (3days) notice that you will need a refill. We will not refill narcotic prescription refill requests after 4:00pm on any weekday, Monday through Thursday, or after 2:00pm on Fridays, or on the weekends. We encourage everyone to explore another way of getting your prescription refill request processed using [employer], our patient web portal through our electronic medical record system. [employer] is an efficient and effective way to communicate your medication request directly to the office and  downloadable as an elizabeth on your smart phone . [employer] also features a review functionality that allows you to view your medication list as well as leave messages for your physician. Are you ready to get connected? If so please review the attatched instructions or speak to any of our staff to get you set up right away! Thank you so much for your cooperation. Should you have any questions please contact our Practice Administrator. The Physicians and Staff,  [facility]

## 2020-01-30 NOTE — PROGRESS NOTES
Chief Complaint   Patient presents with    Neurologic Problem    Stroke         HISTORY OF PRESENT ILLNESS  Socorro Urrutia is a 75 y.o. male who was last seen by me in 2 months ago in the hospital when he was admitted with right sided numbness and weakness. He was found to have left sided basal ganglia bleed, related to poorly controlled blood pressure. He has recovered quite well from his deficits and feels fairly back to baseline. His blood pressure is much better controlled. 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 He was advised not to drive at the time of discharge but feels fairly comfortable that he can drive. Denies any cognitive issues, speech problems, language fluency or mood changes. Wife has noticed that he has been more chill type of a person since the stroke whereas he was somewhat of a hyper person. She also thinks it could be because he is not taking Adderall anymore. He has known about extensive white matter change in his brain for the past 15 years or so and used to follow-up with a different neurologist in this regard. His most recent imaging revealed the same. His last MRI was done about 2 years ago in the [facility]. 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  Blood Pressure Monitor kit 1 Units by Does Not Apply route daily.  lamoTRIgine (LAMICTAL) 150 mg tablet Take 150 mg by mouth two (2) times a day.  losartan (COZAAR) 100 mg tablet Take 100 mg by mouth daily.  lithium carbonate 600 mg capsule Take 600 mg by mouth nightly.  sertraline (ZOLOFT) 50 mg tablet Take 50 mg by mouth daily.     amphetamine-dextroamphetamine XR (ADDERALL XR) 20 mg XR capsule Take 20 mg by mouth two (2) times a day. No current facility-administered medications for this visit. No Known Allergies  Family History   Problem Relation Age of Onset    Heart Disease Brother      Social History     Tobacco Use    Smoking status: Former Smoker     Packs/day: 1.00     Years: 4.00     Pack years: 4.00     Last attempt to quit: 1974     Years since quittin.6    Smokeless tobacco: Never Used   Substance Use Topics    Alcohol use: Yes     Comment: occasional    Drug use: No     History reviewed. No pertinent surgical history. REVIEW OF SYSTEMS  Review of Systems - History obtained from the patient  Psychological ROS: negative  ENT ROS: negative  Hematological and Lymphatic ROS: negative  Endocrine ROS: negative  Respiratory ROS: no cough, shortness of breath, or wheezing  Cardiovascular ROS: no chest pain or dyspnea on exertion  Gastrointestinal ROS: no abdominal pain, change in bowel habits, or black or bloody stools  Genito-Urinary ROS: no dysuria, trouble voiding, or hematuria  Musculoskeletal ROS: negative  Dermatological ROS: negative      PHYSICAL EXAMINATION:    Visit Vitals  /82   Pulse 68   Resp 16   Ht 5' 7\" (1.702 m)   Wt 94.8 kg (209 lb)   SpO2 98%   BMI 32.73 kg/m²       NEUROLOGICAL EXAMINATION:     Mental Status:   Alert and oriented to person, place, and time with recent and remote memory intact. Attention span and concentration are normal. Speech is fluent. Cranial Nerves:    II, III, IV, VI:  Visual acuity grossly intact. Visual fields are normal.    Pupils are equal, round, and reactive to light and accommodation. Extra-ocular movements are full and fluid. V-XII: Hearing is grossly intact. Facial features are symmetric, with normal sensation and strength. The palate rises symmetrically and the tongue protrudes midline. Sternocleidomastoids 5/5. Motor Examination: Normal tone, bulk, and strength. 5/5 muscle strength throughout.   No cogwheel rigidity or clonus present. Sensory exam:  Normal throughout to pinprick, temperature, and vibration sense. Normal proprioception. Coordination:  Finger to nose and rapid arm movement testing was normal.   No resting or intention tremor    Gait and Station:  Steady. Normal arm swing. No Rhomberg or pronator drift. No muscle wasting or fasiculations noted. Reflexes:  DTRs 2+ throughout. Toes downgoing. LABS / IMAGING  CT Results (most recent):  Results from Hospital Encounter encounter on 12/02/19   CT HEAD WO CONT    Narrative EXAM: CT HEAD WO CONT    INDICATION: Basal ganglia hemorrhage    COMPARISON: December 2, 2019. CONTRAST: None. TECHNIQUE: Unenhanced CT of the head was performed using 5 mm images. Brain and  bone windows were generated. CT dose reduction was achieved through use of a  standardized protocol tailored for this examination and automatic exposure  control for dose modulation. Adaptive statistical iterative reconstruction  (ASIR) was utilized. FINDINGS:  The left basal ganglia hyperdensity is similar in size and morphology compared  to the prior study. There is moderate periventricular white matter hypodensities  indicative of microvascular ischemic disease. Ventricles and sulci are  prominent. No midline shift or mass effect. No subarachnoid hemorrhage. No  extra-axial fluid collection. Paranasal sinuses are clear. Impression IMPRESSION:   Unchanged left basal ganglia hemorrhage compared to December 2, 2019. CT images were independently reviewed    ASSESSMENT    ICD-10-CM ICD-9-CM    1. Hemorrhagic stroke ([facility] I61.9 431    2.  White matter disease R90.82 348.89        DISCUSSION  Mr. Lisseth Gallo who recently had a hypertensive basal ganglia bleed  Fortunately he has not had any residual deficits  His blood pressure is now much better controlled  He does have extensive white matter disease but apparently this has been present for at least 15 years. He has seen neurology and has had work-up in this regard before. He does not report any cognitive issues or changes in cognition over time. Deferring any further work-up in this regard for now as this has been a chronic asymptomatic condition and work-up unlikely to   We did discuss that he should continue statin and it would be okay to take aspirin 81 mg daily   Follow-up as needed    Janeth Leos MD  Diplomate, American Board of Psychiatry & Neurology (Neurology)  Diplomate, American Board of Psychiatry & Neurology (Clinical Neurophysiology)  Diplomate, American Board of Electrodiagnostic Medicine    This note will not be viewable in [address]

## 2020-08-19 ENCOUNTER — HOSPITAL ENCOUNTER (OUTPATIENT)
Dept: MRI IMAGING | Age: 65
Discharge: HOME | End: 2020-08-19
Attending: SPECIALIST
Payer: COMMERCIAL

## 2020-08-19 DIAGNOSIS — M48.02 STENOSIS OF CERVICAL SPINE WITH MYELOPATHY (HCC): ICD-10-CM

## 2020-08-19 DIAGNOSIS — G99.2 STENOSIS OF CERVICAL SPINE WITH MYELOPATHY (HCC): ICD-10-CM

## 2020-08-19 PROCEDURE — 72141 MRI NECK SPINE W/O DYE: CPT

## 2020-09-04 ENCOUNTER — OFFICE VISIT (OUTPATIENT)
Dept: NEUROLOGY | Facility: CLINIC | Age: 65
End: 2020-09-04
Payer: COMMERCIAL

## 2020-09-04 VITALS
HEART RATE: 64 BPM | SYSTOLIC BLOOD PRESSURE: 118 MMHG | OXYGEN SATURATION: 97 % | DIASTOLIC BLOOD PRESSURE: 70 MMHG | HEIGHT: 67 IN | BODY MASS INDEX: 32.8 KG/M2 | WEIGHT: 209 LBS | RESPIRATION RATE: 16 BRPM

## 2020-09-04 DIAGNOSIS — R63.8 OTHER SYMPTOMS AND SIGNS CONCERNING FOOD AND FLUID INTAKE: ICD-10-CM

## 2020-09-04 DIAGNOSIS — I61.9 HEMORRHAGIC STROKE (HCC): Primary | ICD-10-CM

## 2020-09-04 DIAGNOSIS — R90.82 WHITE MATTER DISEASE: ICD-10-CM

## 2020-09-04 DIAGNOSIS — G25.81 RESTLESS LEG SYNDROME: ICD-10-CM

## 2020-09-04 PROCEDURE — 1101F PT FALLS ASSESS-DOCD LE1/YR: CPT | Performed by: PSYCHIATRY & NEUROLOGY

## 2020-09-04 PROCEDURE — G8510 SCR DEP NEG, NO PLAN REQD: HCPCS | Performed by: PSYCHIATRY & NEUROLOGY

## 2020-09-04 PROCEDURE — 99214 OFFICE O/P EST MOD 30 MIN: CPT | Performed by: PSYCHIATRY & NEUROLOGY

## 2020-09-04 PROCEDURE — 3017F COLORECTAL CA SCREEN DOC REV: CPT | Performed by: PSYCHIATRY & NEUROLOGY

## 2020-09-04 PROCEDURE — G8536 NO DOC ELDER MAL SCRN: HCPCS | Performed by: PSYCHIATRY & NEUROLOGY

## 2020-09-04 PROCEDURE — G8417 CALC BMI ABV UP PARAM F/U: HCPCS | Performed by: PSYCHIATRY & NEUROLOGY

## 2020-09-04 PROCEDURE — G8427 DOCREV CUR MEDS BY ELIG CLIN: HCPCS | Performed by: PSYCHIATRY & NEUROLOGY

## 2020-09-04 RX ORDER — PANTOPRAZOLE SODIUM 40 MG/1
40 GRANULE, DELAYED RELEASE ORAL DAILY
COMMUNITY

## 2020-09-04 RX ORDER — ROPINIROLE 0.25 MG/1
TABLET, FILM COATED ORAL
Qty: 30 TAB | Refills: 0 | Status: SHIPPED | OUTPATIENT
Start: 2020-09-04 | End: 2020-09-08 | Stop reason: SDUPTHER

## 2020-09-04 NOTE — PATIENT INSTRUCTIONS
PRESCRIPTION REFILL POLICY [facility] Statement to Patients April 1, 2014 In an effort to ensure the large volume of patient prescription refills is processed in the most efficient and expeditious manner, we are asking our patients to assist us by calling your Pharmacy for all prescription refills, this will include also your  Mail Order Pharmacy. The pharmacy will contact our office electronically to continue the refill process. Please do not wait until the last minute to call your pharmacy. We need at least 48 hours (2days) to fill prescriptions. We also encourage you to call your pharmacy before going to  your prescription to make sure it is ready. With regard to controlled substance prescription refill requests (narcotic refills) that need to be picked up at our office, we ask your cooperation by providing us with at least 72 hours (3days) notice that you will need a refill. We will not refill narcotic prescription refill requests after 4:00pm on any weekday, Monday through Thursday, or after 2:00pm on Fridays, or on the weekends. We encourage everyone to explore another way of getting your prescription refill request processed using [employer], our patient web portal through our electronic medical record system. [employer] is an efficient and effective way to communicate your medication request directly to the office and  downloadable as an eilzabeth on your smart phone . [employer] also features a review functionality that allows you to view your medication list as well as leave messages for your physician. Are you ready to get connected? If so please review the attatched instructions or speak to any of our staff to get you set up right away! Thank you so much for your cooperation. Should you have any questions please contact our Practice Administrator. The Physicians and Staff,  [facility]

## 2020-09-04 NOTE — PROGRESS NOTES
Chief Complaint   Patient presents with    Neurologic Problem         HISTORY OF PRESENT ILLNESS  Fay Hankins came back for follow-up. He was last seen in January of this year as a follow-up after left-sided basal ganglia bleed. He is doing quite well and does not have any residual deficits. Occasionally, if he is tired he will start to grab his right leg. Denies any balance issues or falls. He came in to discuss new problem which is overall reduced mobility. He says that it is hard for him to walk fast.  He has started walking with a stooped posture. He has been experiencing restless symptoms in his legs. He is always struggling to find a comfortable position especially at night. At times, he feels as if he has to get up and walk around before he goes to bed again. RECAP  He was seen in the hospital when he was admitted with right sided numbness and weakness. He was found to have left sided basal ganglia bleed, related to poorly controlled blood pressure. He has recovered quite well from his deficits and feels fairly back to baseline. His blood pressure is much better controlled. 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 He was advised not to drive at the time of discharge but feels fairly comfortable that he can drive. Denies any cognitive issues, speech problems, language fluency or mood changes. Wife has noticed that he has been more chill type of a person since the stroke whereas he was somewhat of a hyper person. She also thinks it could be because he is not taking Adderall anymore. He has known about extensive white matter change in his brain for the past 15 years or so and used to follow-up with a different neurologist in this regard. His most recent imaging revealed the same. His last MRI was done about 2 years ago in the [facility] system.        Current Outpatient Medications   Medication Sig    pantoprazole (Protonix) 40 mg granules for oral suspension 40 mg daily.  rOPINIRole (REQUIP) 0.25 mg tablet 1 Q PM for 3 days, then 2 Q PM x 3 days, then 3 q PM    atorvastatin (LIPITOR) 40 mg tablet Take 1 Tab by mouth nightly.  Blood Pressure Monitor kit 1 Units by Does Not Apply route daily.  lamoTRIgine (LAMICTAL) 150 mg tablet Take 150 mg by mouth two (2) times a day.  losartan (COZAAR) 100 mg tablet Take 100 mg by mouth daily.  lithium carbonate 600 mg capsule Take 600 mg by mouth nightly.  sertraline (ZOLOFT) 50 mg tablet Take 50 mg by mouth daily.  amphetamine-dextroamphetamine XR (ADDERALL XR) 20 mg XR capsule Take 20 mg by mouth two (2) times a day. No current facility-administered medications for this visit. PHYSICAL EXAMINATION:    Visit Vitals  /70   Pulse 64   Resp 16   Ht 5' 7\" (1.702 m)   Wt 94.8 kg (209 lb)   SpO2 97%   BMI 32.73 kg/m²       NEUROLOGICAL EXAMINATION:     Mental Status:   Alert and oriented to person, place, and time with recent and remote memory intact. Attention span and concentration are normal. Speech is fluent. Cranial Nerves:    II, III, IV, VI:  Visual acuity grossly intact. Visual fields are normal.    Pupils are equal, round, and reactive to light and accommodation. Extra-ocular movements are full and fluid. V-XII: Hearing is grossly intact. Facial features are symmetric, with normal sensation and strength. The palate rises symmetrically and the tongue protrudes midline. Sternocleidomastoids 5/5. Motor Examination: Normal tone, bulk, and strength. 5/5 muscle strength throughout. Minimal rigidity is noted in the right upper extremity with provocation. Sensory exam:  Normal throughout to pinprick, temperature, and vibration sense. Normal proprioception. Coordination:  Finger to nose and rapid arm movement testing was normal.   No resting or intention tremor    Gait and Station:  Steady. Mildly reduced arm swing on the right.   No Rhomberg or pronator drift.   No muscle wasting or fasiculations noted. Reflexes:  DTRs 2+ throughout. Toes downgoing. LABS / IMAGING  MRI Results (most recent):  Results from Hospital Encounter encounter on 08/19/20   MRI CERV SPINE WO CONT    Narrative EXAM: MRI CERV SPINE WO CONT    INDICATION: . Spinal stenosis, cervical region    COMPARISON: None    TECHNIQUE: MR imaging of the cervical spine was performed using the following  sequences: sagittal T1, T2, STIR;  axial T2, T1.     CONTRAST:  None. FINDINGS:    There is 2 mm anterolisthesis at C4-5. Alignment elsewhere in the cervical spine  is normal. There is no evidence of vertebral compression fracture or focal bone  destruction. There are chronic appearing discogenic changes at C5-6 associated  with mild loss of height of the adjacent endplates. There are no suspicious  marrow signal abnormalities. There is mild kyphosis between C4 and C7. The craniocervical junction is intact. The course, caliber, and signal intensity  of the spinal cord are normal.      The paraspinal soft tissues are within normal limits. C2-C3: No herniation or stenosis. Mild right facet arthropathy. C3-C4: Mild posterior disc osteophyte complex. Bilateral facet arthropathy, left  greater than right. Mild central spinal stenosis. No cord compression. Mild  bilateral foraminal stenosis. C4-C5: No herniation or stenosis. Minimal bulging disc. C5-C6: Disc degeneration with loss of disc height. Mild left uncovertebral joint  hypertrophy. Mild central spinal stenosis with draping of the cord but no cord  compression. Mild left foraminal stenosis. C6-C7: No herniation or stenosis. Mild right uncovertebral joint hypertrophy  with mild right foraminal stenosis. C7-T1: Disc degeneration and loss of disc height. Moderate right uncovertebral  joint hypertrophy with moderate to severe right foraminal stenosis. No central  stenosis. Small perineural cyst in the left foramen. Impression IMPRESSION:  Multilevel degenerative changes. Mild central stenosis at C3-4, without cord  compression. Mild central stenosis at C5-6 with draping of the cord but no cord  compression. Multiple levels of foraminal stenosis as described above. MRI images were reviewed    ASSESSMENT    ICD-10-CM ICD-9-CM    1. Hemorrhagic stroke ([facility] [address]  I61.9 431    2. White matter disease  R90.82 348.89    3. Restless leg syndrome  G25.81 333.94 rOPINIRole (REQUIP) 0.25 mg tablet      FERRITIN   4. Other symptoms and signs concerning food and fluid intake   R63.8 783.9 FERRITIN       DISCUSSION  Mr. Kendra Otero who had a hypertensive left basal ganglia bleed in 2019  Fortunately he has not had any residual deficits    He comes in today with a new symptom of restless feeling in his legs and I concur that his symptoms are suggestive of restless leg syndrome  I recommended trial of ropinirole 0.25 mg in the afternoon and titrate up to 1 mg in the afternoon in 2 weeks  Also check serum ferritin level    I notice mild parkinsonian features including mild rigidity in the right upper extremity, reduced arm swing on the right, overall reduced mobility which may be related to underlying basal ganglia stroke. We will continue to monitor this clinically    He does have extensive white matter disease but apparently this has been present for at least 15 years. Continue aspirin 81 mg daily   Follow-up  in 2 to 3 months    Bozena Richardson MD  Diplomate, American Board of Psychiatry & Neurology (Neurology)  Diplomate, American Board of Psychiatry & Neurology (Clinical Neurophysiology)  Diplomate, American Board of Electrodiagnostic Medicine  This note will not be viewable in [address]

## 2020-09-05 LAB — FERRITIN SERPL-MCNC: 96 NG/ML (ref 30–400)

## 2020-09-08 DIAGNOSIS — G25.81 RESTLESS LEG SYNDROME: ICD-10-CM

## 2020-09-08 RX ORDER — ROPINIROLE 0.25 MG/1
TABLET, FILM COATED ORAL
Qty: 270 TAB | Refills: 1 | Status: SHIPPED | OUTPATIENT
Start: 2020-09-08 | End: 2020-09-09 | Stop reason: SDUPTHER

## 2020-09-09 DIAGNOSIS — G25.81 RESTLESS LEG SYNDROME: ICD-10-CM

## 2020-09-09 RX ORDER — ROPINIROLE 0.25 MG/1
TABLET, FILM COATED ORAL
Qty: 270 TAB | Refills: 1 | Status: SHIPPED | OUTPATIENT
Start: 2020-09-09 | End: 2021-05-17

## 2020-12-22 ENCOUNTER — OFFICE VISIT (OUTPATIENT)
Dept: NEUROLOGY | Age: 65
End: 2020-12-22
Payer: COMMERCIAL

## 2020-12-22 ENCOUNTER — DOCUMENTATION ONLY (OUTPATIENT)
Dept: NEUROLOGY | Age: 65
End: 2020-12-22

## 2020-12-22 VITALS
OXYGEN SATURATION: 96 % | WEIGHT: 209 LBS | HEART RATE: 87 BPM | BODY MASS INDEX: 32.8 KG/M2 | SYSTOLIC BLOOD PRESSURE: 118 MMHG | DIASTOLIC BLOOD PRESSURE: 80 MMHG | HEIGHT: 67 IN | RESPIRATION RATE: 16 BRPM

## 2020-12-22 DIAGNOSIS — M47.812 OSTEOARTHRITIS OF CERVICAL SPINE, UNSPECIFIED SPINAL OSTEOARTHRITIS COMPLICATION STATUS: ICD-10-CM

## 2020-12-22 DIAGNOSIS — R90.82 WHITE MATTER DISEASE: ICD-10-CM

## 2020-12-22 DIAGNOSIS — R26.89 BALANCE PROBLEMS: ICD-10-CM

## 2020-12-22 DIAGNOSIS — I61.9 HEMORRHAGIC STROKE (HCC): ICD-10-CM

## 2020-12-22 DIAGNOSIS — G25.81 RESTLESS LEG SYNDROME: Primary | ICD-10-CM

## 2020-12-22 PROCEDURE — 99214 OFFICE O/P EST MOD 30 MIN: CPT | Performed by: PSYCHIATRY & NEUROLOGY

## 2020-12-22 NOTE — PROGRESS NOTES
Chief Complaint   Patient presents with    Stroke         HISTORY OF PRESENT ILLNESS  Leigh Wong came back for follow-up. He states that ropinirole has helped significantly with his restless leg symptoms. He still will sometimes feel restless and that he has to get up and walk as he is trying to fall asleep at night but overall significantly improved. His balance has not been great and it seems to have decompensated. Denies any significant fall. He had left-sided basal ganglia bleed in December 2019. He is doing quite well and does not have any residual deficits. Occasionally, if he is tired he will start to grab his right leg. He was noted to have mild parkinsonian features at the last visit including mild rigidity in the right upper extremity, reduced arm swing on the right and overall reduced mobility. This was suspected to be due to basal ganglia stroke. Ropinirole seems to have made a difference. Another new complaint he reports is a burning sensation in the lower part of his neck and radiating to the right. Denies any radiation into the arm or weakness/sensory symptoms in the arm. RECAP  He was seen in the hospital in December 2019 when he was admitted with right sided numbness and weakness. He was found to have left sided basal ganglia bleed, related to poorly controlled blood pressure. He has recovered quite well from his deficits and feels fairly back to baseline. His blood pressure is much better controlled. 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 He was advised not to drive at the time of discharge but feels fairly comfortable that he can drive. Denies any cognitive issues, speech problems, language fluency or mood changes. Wife has noticed that he has been more chill type of a person since the stroke whereas he was somewhat of a hyper person. She also thinks it could be because he is not taking Adderall anymore.      He has known about extensive white matter change in his brain for the past 15 years or so and used to follow-up with a different neurologist in this regard. His most recent imaging revealed the same. His last MRI was done about 2 years ago in the [facility] system. Current Outpatient Medications   Medication Sig    rOPINIRole (REQUIP) 0.25 mg tablet Take 3 QHS    pantoprazole (Protonix) 40 mg granules for oral suspension 40 mg daily.  atorvastatin (LIPITOR) 40 mg tablet Take 1 Tab by mouth nightly.  Blood Pressure Monitor kit 1 Units by Does Not Apply route daily.  lamoTRIgine (LAMICTAL) 150 mg tablet Take 150 mg by mouth two (2) times a day.  losartan (COZAAR) 100 mg tablet Take 100 mg by mouth daily.  lithium carbonate 600 mg capsule Take 600 mg by mouth nightly.  sertraline (ZOLOFT) 50 mg tablet Take 50 mg by mouth daily.  amphetamine-dextroamphetamine XR (ADDERALL XR) 20 mg XR capsule Take 20 mg by mouth two (2) times a day. No current facility-administered medications for this visit. PHYSICAL EXAMINATION:    Visit Vitals  /80   Pulse 87   Resp 16   Ht 5' 7\" (1.702 m)   Wt 209 lb (94.8 kg)   SpO2 96%   BMI 32.73 kg/m²       NEUROLOGICAL EXAMINATION:     Mental Status:   Alert and oriented to person, place, and time with recent and remote memory intact. Attention span and concentration are normal. Speech is fluent. Cranial Nerves:    II, III, IV, VI:  Visual acuity grossly intact. Visual fields are normal.    Pupils are equal, round, and reactive to light and accommodation. Extra-ocular movements are full and fluid. V-XII: Hearing is grossly intact. Facial features are symmetric, with normal sensation and strength. The palate rises symmetrically and the tongue protrudes midline. Sternocleidomastoids 5/5. Motor Examination: Normal tone, bulk, and strength. 5/5 muscle strength throughout. Minimal rigidity is noted in the right upper extremity with provocation. Sensory exam:  Normal throughout to pinprick, temperature, and vibration sense. Normal proprioception. Coordination:  Finger to nose and rapid arm movement testing was normal.   No resting or intention tremor    Gait and Station:  Steady. Mildly reduced arm swing on the right. No Rhomberg or pronator drift. No muscle wasting or fasiculations noted. Reflexes:  DTRs 2+ throughout. Toes downgoing. LABS / IMAGING  MRI Results (most recent):  Results from Hospital Encounter encounter on 08/19/20   MRI CERV SPINE WO CONT    Narrative EXAM: MRI CERV SPINE WO CONT    INDICATION: . Spinal stenosis, cervical region    COMPARISON: None    TECHNIQUE: MR imaging of the cervical spine was performed using the following  sequences: sagittal T1, T2, STIR;  axial T2, T1.     CONTRAST:  None. FINDINGS:    There is 2 mm anterolisthesis at C4-5. Alignment elsewhere in the cervical spine  is normal. There is no evidence of vertebral compression fracture or focal bone  destruction. There are chronic appearing discogenic changes at C5-6 associated  with mild loss of height of the adjacent endplates. There are no suspicious  marrow signal abnormalities. There is mild kyphosis between C4 and C7. The craniocervical junction is intact. The course, caliber, and signal intensity  of the spinal cord are normal.      The paraspinal soft tissues are within normal limits. C2-C3: No herniation or stenosis. Mild right facet arthropathy. C3-C4: Mild posterior disc osteophyte complex. Bilateral facet arthropathy, left  greater than right. Mild central spinal stenosis. No cord compression. Mild  bilateral foraminal stenosis. C4-C5: No herniation or stenosis. Minimal bulging disc. C5-C6: Disc degeneration with loss of disc height. Mild left uncovertebral joint  hypertrophy. Mild central spinal stenosis with draping of the cord but no cord  compression. Mild left foraminal stenosis.     C6-C7: No herniation or stenosis. Mild right uncovertebral joint hypertrophy  with mild right foraminal stenosis. C7-T1: Disc degeneration and loss of disc height. Moderate right uncovertebral  joint hypertrophy with moderate to severe right foraminal stenosis. No central  stenosis. Small perineural cyst in the left foramen. Impression IMPRESSION:  Multilevel degenerative changes. Mild central stenosis at C3-4, without cord  compression. Mild central stenosis at C5-6 with draping of the cord but no cord  compression. Multiple levels of foraminal stenosis as described above. MRI images were reviewed    ASSESSMENT    ICD-10-CM ICD-9-CM    1. Restless leg syndrome  G25.81 333.94    2. Hemorrhagic stroke ([facility] [address]  I61.9 431    3. White matter disease  R90.82 348.89    4.  Osteoarthritis of cervical spine, unspecified spinal osteoarthritis complication status  K56.418 721.0 REFERRAL TO PHYSICAL THERAPY   5. Balance problems  R26.89 781.99 REFERRAL TO PHYSICAL THERAPY       DISCUSSION  Mr. Yenifer Novoa who had a hypertensive left basal ganglia bleed in Dec 2019  Fortunately he has not had any residual deficits  Extensive white matter disease has been noted on his brain imaging this has been present on a chronic basis  He also has restless leg syndrome and mild parkinsonism was noted possibly related to underlying basal ganglia stroke  Ropinirole has helped with the symptoms but overall his balance seems to have decompensated    I have recommended a course of PT to help strengthen his balance and also regarding cervical spondylosis/degenerative changes in his cervical spine that are likely causing burning paresthesias in the neck    Continue aspirin 81 mg daily  Follow-up in 4 to 6 months    Wayne Michael MD  Diplomate, American Board of Psychiatry & Neurology (Neurology)  Diplomate, American Board of Psychiatry & Neurology (Clinical Neurophysiology)  Diplomate, American Board of Electrodiagnostic Medicine

## 2020-12-22 NOTE — PATIENT INSTRUCTIONS
PRESCRIPTION REFILL POLICY [address] [place] [address] Statement to Patients April 1, 2014 In an effort to ensure the large volume of patient prescription refills is processed in the most efficient and expeditious manner, we are asking our patients to assist us by calling your Pharmacy for all prescription refills, this will include also your  Mail Order Pharmacy. The pharmacy will contact our office electronically to continue the refill process. Please do not wait until the last minute to call your pharmacy. We need at least 48 hours (2days) to fill prescriptions. We also encourage you to call your pharmacy before going to  your prescription to make sure it is ready. With regard to controlled substance prescription refill requests (narcotic refills) that need to be picked up at our office, we ask your cooperation by providing us with at least 72 hours (3days) notice that you will need a refill. We will not refill narcotic prescription refill requests after 4:00pm on any weekday, Monday through Thursday, or after 2:00pm on Fridays, or on the weekends. We encourage everyone to explore another way of getting your prescription refill request processed using [employer], our patient web portal through our electronic medical record system. [employer] is an efficient and effective way to communicate your medication request directly to the office and  downloadable as an elizabeth on your smart phone . [employer] also features a review functionality that allows you to view your medication list as well as leave messages for your physician. Are you ready to get connected? If so please review the attatched instructions or speak to any of our staff to get you set up right away! Thank you so much for your cooperation. Should you have any questions please contact our Practice Administrator. The Physicians and Staff,  [address] [place] [address]

## 2021-01-06 ENCOUNTER — APPOINTMENT (OUTPATIENT)
Dept: PHYSICAL THERAPY | Age: 66
End: 2021-01-06

## 2021-01-07 ENCOUNTER — HOSPITAL ENCOUNTER (OUTPATIENT)
Dept: PHYSICAL THERAPY | Age: 66
Discharge: HOME | End: 2021-01-07
Payer: MEDICARE

## 2021-01-07 PROCEDURE — 97161 PT EVAL LOW COMPLEX 20 MIN: CPT | Performed by: PHYSICAL THERAPIST

## 2021-01-07 NOTE — PROGRESS NOTES
PT INITIAL EVALUATION NOTE 2-15    [facility]  [address] [facility], [address]  Phone: [phone]  Fax: [phone]    Patient Name: Naveed Guzman  Date:2021  : 1955  [x]  Patient  Verified  Payor: Laura Onielyvrose / Plan: [facility] [employer] MEDICARE CHOICE PPO/PFFS / Product Type: Managed Care Medicare /     In time: 1:15 P  Out time: 2:00 P  Total Treatment Time (min): 45  Total Timed Codes (min): 5  1:1 Treatment Time (min): 45  Visit #: 1     Treatment Area: Low back pain [M54.5]  Other abnormalities of gait and mobility [R26.89]    SUBJECTIVE  Any medication changes, allergies to medications, adverse drug reactions, diagnosis change, or new procedure performed?: [] No    [x] Yes (see summary sheet for update)    Current symptoms/chief complaint and date of onset/injury:   Referred to PT by neurologist-- Dr. Tu Reese. Cervical pain, \"cracking\", \"burning\" sensation. Pain in upper back as well. \"sometime in \"  L sided stroke 2019. He has balance problems. No recent falls- \"I sway and will catch myself\"  Pain in L ankle-- pt points along L achilles tendon. \"I have to take the steps one at a time. It is burning as well\"  He pitches his head forward, \"when I stand up straight I have excruciating pain in my neck\"    Aggravated by:  See above  Eased by:  Cervical--heat    Location of symptoms: balance, cervical, L ankle  Pain Level (0-10 scale): 8/10 max  5/10 min  8/10 today    Diagnostic Tests: [] Lab work [x] X-rays    [] CT [x] MRI--      [] Other:  Results (per report of the patient): \"I don't remember\". He states that his vertebrae in his neck are getting closer together per recent x-rays  Per CC:   IMPRESSION:  Multilevel degenerative changes. Mild central stenosis at C3-4, without cord  compression. Mild central stenosis at C5-6 with draping of the cord but no cord  compression.  Multiple levels of foraminal stenosis as described above.    PMH: Significant for basal ganglia stroke Dec 2019  Social/Recreation/Work: Retired.  Drives for [employer]  Prior level of function: balance problems since stroke in Dec 2019; cervical pain for approx 1 year  Patient goal(s): \"reduce pain, reduce tightening, improve balance\"    OBJECTIVE:    Posture/Observation:   Poor sitting, standing posture-- forward head, cervical flexion, lumbar flexion    Gait: unsteady gait-- demonstrates inc'd toe out on R, dec'd hip, knee flex on R, dec'd R>L arm swing     Strength (1-5)           Right Left   Hip flexion 4 4   Knee ext 5 5   Knee flex 5 5   Ankle DF 5 5        Shoulder flex 4 4   Shoulder scap 4 4     Cervical AROM:  Flex= 34 deg  Ext= 32 deg  R rot= 60 deg  L rot= 60 deg  R SBing= 12 deg  L SBing= 12 deg    Palpation: TTP L achilles    LE Flexibility: inc'd muscle turgor bilat gross LE's    Modified CTSIB:  Condition 1) 20 sec, no sway  Condition 2) 20 sec, min to mod sway  Condition 3) 20 sec, min to mod sway  Condition 4) 6 sec, UE's uncrossed and EO    Tandem stance  R ant= 15 sec, mod sway  L ant= 15 sec, mod sway    SLS on R: 5 sec, mod to max sway  SLS on L:  5 sec, mod to max sway    FOTO: NT      15 min Therapeutic Exercise:  [x] See flow sheet : instructed in HEP   Rationale: increase ROM and increase strength to improve the patients ability to perform daily chores, activities            With   [x] TE   [] TA   [] neuro   [] other: Patient Education: [x] Review HEP    [] Progressed/Changed HEP based on:   [x] positioning   [] body mechanics   [] transfers   [x] heat/ice application    [x] other: davon/phys; PT POC ;importance of performing HEP in order to maximize benefit of PT; use of heat for 10-15 to manage cervical symptoms; importance of postural awareness     Pain Level (0-10 scale) post treatment: not reported    ASSESSMENT:  See Assessment     [x]  See Plan of [id number], PT DPT 1/7/2021  1:19 PM

## 2021-01-07 NOTE — PROGRESS NOTES
Physical Therapy at [facility],   a part of [address] [place] [address], [address] [place] [address]  Phone: [phone]  Fax: [phone]    Plan of Care/Statement of Necessity for Physical Therapy Services  2-15    Patient name: Lexus Mueller  : 1955  Provider#: [id number]  Referral source: Oj Vu MD      Medical/Treatment Diagnosis: Low back pain [M54.5]  Other abnormalities of gait and mobility [R26.89]     Prior Hospitalization: see medical history     Comorbidities: see evaluation  Prior Level of Function: see evaluation  Medications: Verified on Patient Summary List  Start of Care:       Onset Date: stroke 2019   The Plan of Care and following information is based on the information from the initial evaluation. Assessment/ key information: Pt is a 77year old male presenting with balance dysfunction s/p basal ganglia stroke in Dec 2019. Also presenting with cervical pain, postural dysfunction likely related to stroke, cervical MRI + multilevel degenerative changes/stenosis.  He will benefit from PT to address problem list below    Evaluation Complexity History MEDIUM  Complexity : 1-2 comorbidities / personal factors will impact the outcome/ POC ; Examination MEDIUM Complexity : 3 Standardized tests and measures addressing body structure, function, activity limitation and / or participation in recreation  ;Presentation LOW Complexity : Stable, uncomplicated  ;Clinical Decision Making MEDIUM Complexity : FOTO score of 26-74  Overall Complexity Rating: MEDIUM    Problem List: pain affecting function, decrease ROM, decrease strength, impaired gait/ balance, decrease ADL/ functional abilitiies, decrease activity tolerance and decrease flexibility/ joint mobility   Treatment Plan may include any combination of the following: Therapeutic exercise, Therapeutic activities, Neuromuscular re-education, Physical agent/modality, Gait/balance training, Manual therapy, Patient education, Self Care training, Functional mobility training, Home safety training and Stair training  Patient / Family readiness to learn indicated by: asking questions, trying to perform skills and interest  Persons(s) to be included in education: patient (P)  Barriers to Learning/Limitations: None  Patient Goal (s): see evaluation  Patient Self Reported Health Status: fair  Rehabilitation Potential: fair    Short Term Goals: To be accomplished in 2-3 weeks:  1) Pt will be independent in initial HEP  2) Pt will report >/=25% improvement in cervical pain  3) Pt will perform SLS for >/=3 sec bilat without hip drop in order to improve safety, decrease fall risk    Long Term Goals: To be accomplished in 6-8 weeks:  1)  Pt will demonstrate good standing, sitting balance in clinic without cueing  2) Pt will improve bilat shoulder flex, scap strength to >/=4+/5 in order to assist in lifting objects  3) Pt will perform NBOS on airex with EC for >/=10 sec in order to improve safety, decrease fall risk  4) Pt will report >/=50% improvement in cervical pain    Frequency / Duration: Patient to be seen 1-2 times per week for 4-6 weeks. Patient/ Caregiver education and instruction: self care, activity modification and exercises    [x]  Plan of care has been reviewed with PTA    Certification Period: 1/7/2021-4/3/2021  Kristy Olson, PT 1/7/2021    ________________________________________________________________________    I certify that the above Therapy Services are being furnished while the patient is under my care. I agree with the treatment plan and certify that this therapy is necessary.     [de-identified] Signature:____________________  Date:____________Time: _________

## 2021-01-14 ENCOUNTER — HOSPITAL ENCOUNTER (OUTPATIENT)
Dept: PHYSICAL THERAPY | Age: 66
Discharge: HOME | End: 2021-01-14
Payer: MEDICARE

## 2021-01-14 PROCEDURE — 97110 THERAPEUTIC EXERCISES: CPT | Performed by: PHYSICAL THERAPIST

## 2021-01-14 NOTE — PROGRESS NOTES
PT DAILY TREATMENT NOTE - [facility] 2-15    Patient Name: Erwin Garvey  Date:2021  : 1955  [x]  Patient  Verified  Payor: Xander Amor / Plan: [facility] MEDICARE CHOICE PPO/PFFS / Product Type: Managed Care Medicare /    In time: 1:15 P  Out time: 2:10  Total Treatment Time (min): 55  Total Timed Codes (min): 55  1:1 Treatment Time ( only): 55   Visit #:  2    Treatment Area: Low back pain [M54.5]  Other abnormalities of gait and mobility [R26.89]    SUBJECTIVE  Pain Level (0-10 scale): 7-8  Any medication changes, allergies to medications, adverse drug reactions, diagnosis change, or new procedure performed?: [x] No    [] Yes (see summary sheet for update)  Subjective functional status/changes:   [] No changes reported  Pt reports he has back pain, \"burning\" pain in his neck. His neck is cracking a lot. He would like to work on his balance today also    OBJECTIVE      55 min Therapeutic Exercise:  [x] See flow sheet : progressed per flow sheet   Rationale: increase ROM, increase strength, improve coordination, improve balance and increase proprioception to improve the patients ability to perform daily chores, activities with dec'd symptoms      min Neuromuscular Re-education:  -  See flow sheet :   Rationale:      min Manual Therapy:     Rationale: With   [] TE   [] TA   [] Neuro   [] SC   [] other: Patient Education: [x] Review HEP    [] Progressed/Changed HEP based on:   [] positioning   [] body mechanics   [] transfers   [] heat/ice application    [] other:      Other Objective/Functional Measures: n/a     Pain Level (0-10 scale) post treatment: \"a little better\"    ASSESSMENT/Changes in Function:   Updated HEP handout. Roberto progression of therapeutic exercise well.  Cues for upright posture throughout PT visit  Patient will continue to benefit from skilled PT services to modify and progress therapeutic interventions, address functional mobility deficits, address ROM deficits, address strength deficits, analyze and address soft tissue restrictions, analyze and cue movement patterns, analyze and modify body mechanics/ergonomics, assess and modify postural abnormalities and address imbalance/dizziness to attain remaining goals.      [x]  See Plan of Care  []  See progress note/recertification  []  See Discharge Summary         Progress towards goals / Updated goals:  NT    PLAN  []  Upgrade activities as tolerated     [x]  Continue plan of care  []  Update interventions per flow sheet       []  Discharge due to:_  []  Other:_      Piper Castillo, PT 1/14/2021

## 2021-01-21 ENCOUNTER — APPOINTMENT (OUTPATIENT)
Dept: PHYSICAL THERAPY | Age: 66
End: 2021-01-21
Payer: MEDICARE

## 2021-01-28 ENCOUNTER — APPOINTMENT (OUTPATIENT)
Dept: PHYSICAL THERAPY | Age: 66
End: 2021-01-28
Payer: MEDICARE

## 2021-02-04 ENCOUNTER — APPOINTMENT (OUTPATIENT)
Dept: PHYSICAL THERAPY | Age: 66
End: 2021-02-04

## 2021-05-15 DIAGNOSIS — G25.81 RESTLESS LEG SYNDROME: ICD-10-CM

## 2021-05-17 RX ORDER — ROPINIROLE 0.25 MG/1
TABLET, FILM COATED ORAL
Qty: 270 TAB | Refills: 1 | Status: SHIPPED | OUTPATIENT
Start: 2021-05-17 | End: 2021-11-19

## 2021-06-30 ENCOUNTER — OFFICE VISIT (OUTPATIENT)
Dept: NEUROLOGY | Age: 66
End: 2021-06-30
Payer: MEDICARE

## 2021-06-30 VITALS
OXYGEN SATURATION: 97 % | HEART RATE: 75 BPM | SYSTOLIC BLOOD PRESSURE: 122 MMHG | WEIGHT: 209 LBS | BODY MASS INDEX: 32.8 KG/M2 | HEIGHT: 67 IN | DIASTOLIC BLOOD PRESSURE: 80 MMHG | RESPIRATION RATE: 16 BRPM

## 2021-06-30 DIAGNOSIS — M47.812 OSTEOARTHRITIS OF CERVICAL SPINE, UNSPECIFIED SPINAL OSTEOARTHRITIS COMPLICATION STATUS: ICD-10-CM

## 2021-06-30 DIAGNOSIS — G25.81 RESTLESS LEG SYNDROME: Primary | ICD-10-CM

## 2021-06-30 DIAGNOSIS — R90.82 WHITE MATTER DISEASE: ICD-10-CM

## 2021-06-30 DIAGNOSIS — G47.61 PERIODIC LIMB MOVEMENT: ICD-10-CM

## 2021-06-30 PROCEDURE — 99214 OFFICE O/P EST MOD 30 MIN: CPT | Performed by: PSYCHIATRY & NEUROLOGY

## 2021-06-30 PROCEDURE — G8536 NO DOC ELDER MAL SCRN: HCPCS | Performed by: PSYCHIATRY & NEUROLOGY

## 2021-06-30 PROCEDURE — 3017F COLORECTAL CA SCREEN DOC REV: CPT | Performed by: PSYCHIATRY & NEUROLOGY

## 2021-06-30 PROCEDURE — 1101F PT FALLS ASSESS-DOCD LE1/YR: CPT | Performed by: PSYCHIATRY & NEUROLOGY

## 2021-06-30 PROCEDURE — G8417 CALC BMI ABV UP PARAM F/U: HCPCS | Performed by: PSYCHIATRY & NEUROLOGY

## 2021-06-30 PROCEDURE — G8510 SCR DEP NEG, NO PLAN REQD: HCPCS | Performed by: PSYCHIATRY & NEUROLOGY

## 2021-06-30 PROCEDURE — G8427 DOCREV CUR MEDS BY ELIG CLIN: HCPCS | Performed by: PSYCHIATRY & NEUROLOGY

## 2021-06-30 RX ORDER — MELOXICAM 15 MG/1
15 TABLET ORAL DAILY
COMMUNITY

## 2021-06-30 NOTE — PROGRESS NOTES
Mr. Nieves Edvin presents today to follow up RLS. He stated his symptoms come and go. Depression screening done on this patient.

## 2021-06-30 NOTE — PROGRESS NOTES
Chief Complaint   Patient presents with    Neurologic Problem         HISTORY OF PRESENT ILLNESS  Kira Willis came back for follow-up. Overall, he has remained stable. Balance is still somewhat of an issue but denies any falls. Physical therapy has helped. He has been doing epidural injections for cervical paresthesias and it seems to be helping. MRI of the cervical spine did not show any significant foraminal narrowing  Currently taking ropinirole 0.75 mg at bedtime for restless leg syndrome. Still sometimes gets a feeling of restlessness in  his legs and he has to get up and walk as he is trying to fall asleep at night. Does have intermittent leg movements when he is sleeping    He had left-sided basal ganglia bleed in December 2019. He is doing quite well and does not have any residual deficits. Occasionally, if he is tired he will start to grab his right leg. He was noted to have mild parkinsonian features including mild rigidity in the right upper extremity, reduced arm swing on the right and overall reduced mobility. This was suspected to be due to basal ganglia stroke. Ropinirole seems to have made a difference. MARILIAP  He was seen in the hospital in December 2019 when he was admitted with right sided numbness and weakness. He was found to have left sided basal ganglia bleed, related to poorly controlled blood pressure. He has recovered quite well from his deficits and feels fairly back to baseline. His blood pressure is much better controlled. 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 He was advised not to drive at the time of discharge but feels fairly comfortable that he can drive. Denies any cognitive issues, speech problems, language fluency or mood changes. Wife has noticed that he has been more chill type of a person since the stroke whereas he was somewhat of a hyper person.   She also thinks it could be because he is not taking Adderall anymore. He has known about extensive white matter change in his brain for the past 15 years or so and used to follow-up with a different neurologist in this regard. His most recent imaging revealed the same. His last MRI was done about 2 years ago in the [facility]. Current Outpatient Medications   Medication Sig    meloxicam (MOBIC) 15 mg tablet Take 15 mg by mouth daily.  rOPINIRole (REQUIP) 0.25 mg tablet TAKE 3 TABLETS BY MOUTH AT BEDTIME    pantoprazole (Protonix) 40 mg granules for oral suspension 40 mg daily.  atorvastatin (LIPITOR) 40 mg tablet Take 1 Tab by mouth nightly.  Blood Pressure Monitor kit 1 Units by Does Not Apply route daily.  lamoTRIgine (LAMICTAL) 150 mg tablet Take 150 mg by mouth two (2) times a day.  losartan (COZAAR) 100 mg tablet Take 100 mg by mouth daily.  sertraline (ZOLOFT) 50 mg tablet Take 50 mg by mouth daily. No current facility-administered medications for this visit. PHYSICAL EXAMINATION:    Visit Vitals  /80   Pulse 75   Resp 16   Ht 5' 7\" (1.702 m)   Wt 209 lb (94.8 kg)   SpO2 97%   BMI 32.73 kg/m²       NEUROLOGICAL EXAMINATION:     Mental Status:   Alert and oriented to person, place, and time. Attention span and concentration are normal. Speech is fluent. Cranial Nerves:    II, III, IV, VI:  Visual acuity grossly intact. Visual fields are normal.    Pupils are equal, round, and reactive to light and accommodation. Extra-ocular movements are full and fluid. V-XII: Hearing is grossly intact. Facial features are symmetric, with normal sensation and strength. The palate rises symmetrically and the tongue protrudes midline. Sternocleidomastoids 5/5. Motor Examination: Normal tone, bulk, and strength. 5/5 muscle strength throughout. Minimal rigidity is noted in the right upper extremity with provocation. Sensory exam:  Normal throughout to pinprick, temperature, and vibration sense.   Normal proprioception. Coordination:  Finger to nose and rapid arm movement testing was normal.   No resting or intention tremor    Gait and Station:  Steady. Mildly reduced arm swing on the right. No Rhomberg or pronator drift. No muscle wasting or fasiculations noted. Reflexes:  DTRs 2+ throughout. Toes downgoing. LABS / IMAGING  MRI Results (most recent):  Results from Hospital Encounter encounter on 08/19/20    MRI CERV SPINE WO CONT    Narrative  EXAM: MRI CERV SPINE WO CONT    INDICATION: . Spinal stenosis, cervical region    COMPARISON: None    TECHNIQUE: MR imaging of the cervical spine was performed using the following  sequences: sagittal T1, T2, STIR;  axial T2, T1.    CONTRAST:  None. FINDINGS:    There is 2 mm anterolisthesis at C4-5. Alignment elsewhere in the cervical spine  is normal. There is no evidence of vertebral compression fracture or focal bone  destruction. There are chronic appearing discogenic changes at C5-6 associated  with mild loss of height of the adjacent endplates. There are no suspicious  marrow signal abnormalities. There is mild kyphosis between C4 and C7. The craniocervical junction is intact. The course, caliber, and signal intensity  of the spinal cord are normal.    The paraspinal soft tissues are within normal limits. C2-C3: No herniation or stenosis. Mild right facet arthropathy. C3-C4: Mild posterior disc osteophyte complex. Bilateral facet arthropathy, left  greater than right. Mild central spinal stenosis. No cord compression. Mild  bilateral foraminal stenosis. C4-C5: No herniation or stenosis. Minimal bulging disc. C5-C6: Disc degeneration with loss of disc height. Mild left uncovertebral joint  hypertrophy. Mild central spinal stenosis with draping of the cord but no cord  compression. Mild left foraminal stenosis. C6-C7: No herniation or stenosis.  Mild right uncovertebral joint hypertrophy  with mild right foraminal stenosis. C7-T1: Disc degeneration and loss of disc height. Moderate right uncovertebral  joint hypertrophy with moderate to severe right foraminal stenosis. No central  stenosis. Small perineural cyst in the left foramen. Impression  IMPRESSION:  Multilevel degenerative changes. Mild central stenosis at C3-4, without cord  compression. Mild central stenosis at C5-6 with draping of the cord but no cord  compression. Multiple levels of foraminal stenosis as described above. MRI images were reviewed    ASSESSMENT    ICD-10-CM ICD-9-CM    1. Restless leg syndrome  G25.81 333.94    2. White matter disease  R90.82 348.89    3. Osteoarthritis of cervical spine, unspecified spinal osteoarthritis complication status  C17.010 721.0    4. Periodic limb movement  G47.61 327.51        DISCUSSION  Mr. Amish Beavers who had a hypertensive left basal ganglia bleed in Dec 2019  Fortunately he has not had any residual deficits  Extensive white matter disease has been noted on his brain imaging this has been present on a chronic basis  He also has restless leg syndrome and mild parkinsonism was noted possibly related to underlying basal ganglia stroke  Ropinirole has helped with the symptoms.   I have recommended switching the timing to 5 PM instead of bedtime  He also has periodic limb movements of sleep and has had sleep study in the past which did not show any obstructive sleep apnea  This symptom does not seem to be bothersome for now  Continue home exercise program to strengthen his balance and also cervical paraspinal muscles    Continue aspirin 81 mg daily  Follow-up annually or earlier if needed    Tangela Perez MD  Diplomate, American Board of Psychiatry & Neurology (Neurology)  [place] Board of Psychiatry & Neurology (Clinical Neurophysiology)  Diplomate, American Board of Electrodiagnostic Medicine

## 2021-06-30 NOTE — PATIENT INSTRUCTIONS
[facility]   Statement to Patients  April 1, 2014      In an effort to ensure the large volume of patient prescription refills is processed in the most efficient and expeditious manner, we are asking our patients to assist us by calling your Pharmacy for all prescription refills, this will include also your  Mail Order Pharmacy. The pharmacy will contact our office electronically to continue the refill process. Please do not wait until the last minute to call your pharmacy. We need at least 48 hours (2days) to fill prescriptions. We also encourage you to call your pharmacy before going to  your prescription to make sure it is ready. With regard to controlled substance prescription refill requests (narcotic refills) that need to be picked up at our office, we ask your cooperation by providing us with at least 72 hours (3days) notice that you will need a refill. We will not refill narcotic prescription refill requests after 4:00pm on any weekday, Monday through Thursday, or after 2:00pm on Fridays, or on the weekends. We encourage everyone to explore another way of getting your prescription refill request processed using [employer], our patient web portal through our electronic medical record system. [employer] is an efficient and effective way to communicate your medication request directly to the office and  downloadable as an elizabeth on your smart phone . [employer] also features a review functionality that allows you to view your medication list as well as leave messages for your physician. Are you ready to get connected? If so please review the attatched instructions or speak to any of our staff to get you set up right away! Thank you so much for your cooperation. Should you have any questions please contact our Practice Administrator.     The Physicians and Staff,  [facility]

## 2021-11-19 DIAGNOSIS — G25.81 RESTLESS LEG SYNDROME: ICD-10-CM

## 2021-11-19 RX ORDER — ROPINIROLE 0.25 MG/1
TABLET, FILM COATED ORAL
Qty: 270 TABLET | Refills: 1 | Status: SHIPPED | OUTPATIENT
Start: 2021-11-19 | End: 2022-06-02

## 2022-03-18 PROBLEM — I61.9 HEMORRHAGIC STROKE (HCC): Status: ACTIVE | Noted: 2019-12-02

## 2022-06-02 DIAGNOSIS — G25.81 RESTLESS LEG SYNDROME: ICD-10-CM

## 2022-06-02 RX ORDER — ROPINIROLE 0.25 MG/1
TABLET, FILM COATED ORAL
Qty: 270 TABLET | Refills: 1 | Status: SHIPPED | OUTPATIENT
Start: 2022-06-02

## 2022-09-28 ENCOUNTER — OFFICE VISIT (OUTPATIENT)
Dept: ORTHOPEDIC SURGERY | Age: 67
End: 2022-09-28
Payer: MEDICARE

## 2022-09-28 VITALS — BODY MASS INDEX: 32.8 KG/M2 | HEIGHT: 67 IN | WEIGHT: 209 LBS

## 2022-09-28 DIAGNOSIS — M25.562 LEFT KNEE PAIN, UNSPECIFIED CHRONICITY: Primary | ICD-10-CM

## 2022-09-28 DIAGNOSIS — M25.462 EFFUSION, LEFT KNEE: ICD-10-CM

## 2022-09-28 DIAGNOSIS — M23.204 DEGENERATIVE TEAR OF MEDIAL MENISCUS, LEFT: ICD-10-CM

## 2022-09-28 PROCEDURE — G8536 NO DOC ELDER MAL SCRN: HCPCS | Performed by: ORTHOPAEDIC SURGERY

## 2022-09-28 PROCEDURE — G8427 DOCREV CUR MEDS BY ELIG CLIN: HCPCS | Performed by: ORTHOPAEDIC SURGERY

## 2022-09-28 PROCEDURE — 1123F ACP DISCUSS/DSCN MKR DOCD: CPT | Performed by: ORTHOPAEDIC SURGERY

## 2022-09-28 PROCEDURE — 3017F COLORECTAL CA SCREEN DOC REV: CPT | Performed by: ORTHOPAEDIC SURGERY

## 2022-09-28 PROCEDURE — 99203 OFFICE O/P NEW LOW 30 MIN: CPT | Performed by: ORTHOPAEDIC SURGERY

## 2022-09-28 PROCEDURE — G8432 DEP SCR NOT DOC, RNG: HCPCS | Performed by: ORTHOPAEDIC SURGERY

## 2022-09-28 PROCEDURE — G8417 CALC BMI ABV UP PARAM F/U: HCPCS | Performed by: ORTHOPAEDIC SURGERY

## 2022-09-28 PROCEDURE — 1101F PT FALLS ASSESS-DOCD LE1/YR: CPT | Performed by: ORTHOPAEDIC SURGERY

## 2022-09-28 PROCEDURE — 20610 DRAIN/INJ JOINT/BURSA W/O US: CPT | Performed by: ORTHOPAEDIC SURGERY

## 2022-09-28 RX ORDER — VALSARTAN 160 MG/1
TABLET ORAL
COMMUNITY
Start: 2022-09-03

## 2022-09-28 RX ORDER — LIDOCAINE HYDROCHLORIDE 10 MG/ML
2 INJECTION INFILTRATION; PERINEURAL ONCE
Status: COMPLETED | OUTPATIENT
Start: 2022-09-28 | End: 2022-09-28

## 2022-09-28 RX ORDER — TRIAMCINOLONE ACETONIDE 40 MG/ML
40 INJECTION, SUSPENSION INTRA-ARTICULAR; INTRAMUSCULAR ONCE
Status: COMPLETED | OUTPATIENT
Start: 2022-09-28 | End: 2022-09-28

## 2022-09-28 RX ADMIN — LIDOCAINE HYDROCHLORIDE 2 ML: 10 INJECTION INFILTRATION; PERINEURAL at 09:26

## 2022-09-28 RX ADMIN — TRIAMCINOLONE ACETONIDE 40 MG: 40 INJECTION, SUSPENSION INTRA-ARTICULAR; INTRAMUSCULAR at 09:26

## 2022-09-28 NOTE — PROGRESS NOTES
Debi Raymond (: 1955) is a 74 y.o. male, patient, here for evaluation of the following chief complaint(s):  Knee Pain (Left knee pain )       HPI:    Patient's chief complaint is left knee pain. Probably about 4 months of knee symptoms. His knee gives out. Feels pain along the medial joint line. Associated with swelling and loss of range of motion. No Known Allergies    Current Outpatient Medications   Medication Sig    valsartan (DIOVAN) 160 mg tablet TAKE 1 TABLET BY MOUTH EVERY DAY . RETURN TO CLINIC IN 1 WEEK TO CHECK BLOOD PRESSURE ON NEW MED    rOPINIRole (REQUIP) 0.25 mg tablet TAKE 3 TABLETS BY MOUTH AT BEDTIME    meloxicam (MOBIC) 15 mg tablet Take 15 mg by mouth daily. pantoprazole (PROTONIX) 40 mg granules for oral suspension 40 mg daily. atorvastatin (LIPITOR) 40 mg tablet Take 1 Tab by mouth nightly. Blood Pressure Monitor kit 1 Units by Does Not Apply route daily. lamoTRIgine (LaMICtal) 150 mg tablet Take 150 mg by mouth two (2) times a day. losartan (COZAAR) 100 mg tablet Take 100 mg by mouth daily. sertraline (ZOLOFT) 50 mg tablet Take 50 mg by mouth daily. No current facility-administered medications for this visit. 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 No pertinent surgical history.     Family History   Problem Relation Age of Onset    Heart Disease Brother         Social History     Socioeconomic History    Marital status:      Spouse name: Not on file    Number of children: Not on file    Years of education: Not on file    Highest education level: Not on file   Occupational History    Not on file   Tobacco Use    Smoking status: Former     Packs/day: 1.00     Years: 4.00     Pack years: 4.00     Types: Cigarettes     Quit date: 1974     Years since quittin.2 Smokeless tobacco: Never   Substance and Sexual Activity    Alcohol use: Yes     Comment: occasional    Drug use: No    Sexual activity: Not on file   Other Topics Concern    Not on file   Social History Narrative    Not on file     Social Determinants of Health     Financial Resource Strain: Not on file   Food Insecurity: Not on file   Transportation Needs: Not on file   Physical Activity: Not on file   Stress: Not on file   Social Connections: Not on file   Intimate Partner Violence: Not on file   Housing Stability: Not on file       ROS    Positive for: Musculoskeletal  Last edited by Romero Vieira on 9/28/2022  9:04 AM.            Vitals:  Ht 5' 7\" (1.702 m)   Wt 209 lb (94.8 kg)   BMI 32.73 kg/m²    Body mass index is 32.73 kg/m². PHYSICAL EXAM:  On exam today left hip is painless. Left knee has moderate sized effusion. His knee range of motion on the left is 3 to 105 degrees limited by the effusion. There is a palpable Baker's cyst.  Brittni's testing elicits medial joint line tenderness as well as a palpable clunk. Knee is otherwise ligamentously stable x4. IMAGING:  XR Results (most recent):  Results from Appointment encounter on 09/28/22    XR KNEE LT 3 V    Narrative  Left knee x-ray 3 views. Joint space well-maintained. Effusion suprapatellar is noted. Bony structures and articular cartilage are normal.        ASSESSMENT/PLAN:  1. Left knee pain, unspecified chronicity  -     XR KNEE LT 3 V; Future  -     triamcinolone acetonide (KENALOG-40) 40 mg/mL injection 40 mg; 40 mg, Intra artICUlar, ONCE, 1 dose, On Wed 9/28/22 at 1000  -     lidocaine (XYLOCAINE) 10 mg/mL (1 %) injection 2 mL; 2 mL, Intra artICUlar, ONCE, 1 dose, On Wed 9/28/22 at 1000  2.  Effusion, left knee  -     triamcinolone acetonide (KENALOG-40) 40 mg/mL injection 40 mg; 40 mg, Intra artICUlar, ONCE, 1 dose, On Wed 9/28/22 at 1000  -     lidocaine (XYLOCAINE) 10 mg/mL (1 %) injection 2 mL; 2 mL, Intra artICUlar, ONCE, 1 dose, On Wed 9/28/22 at 1000  3. Degenerative tear of medial meniscus, left  -     triamcinolone acetonide (KENALOG-40) 40 mg/mL injection 40 mg; 40 mg, Intra artICUlar, ONCE, 1 dose, On Wed 9/28/22 at 1000  -     lidocaine (XYLOCAINE) 10 mg/mL (1 %) injection 2 mL; 2 mL, Intra artICUlar, ONCE, 1 dose, On Wed 9/28/22 at 1000    Discussed risks/benefits of cortisone injection and patient gave verbal consent. Under sterile conditions, the left knee was injected with  2cc 1% Lidocaine and cc 40 mg/cc Kenalog intra-articularly, tolerated the procedure well. If symptoms persist after the injection where he continues mechanical symptoms and I would do MRI to evaluate medial meniscus. We will give this 1 month. Home exercise program.      An electronic signature was used to authenticate this note.   --Judy Leiva MD

## 2022-09-28 NOTE — PROGRESS NOTES
HPI: Jorge Celis (: 1955) is a 74 y.o. male, patient, here for evaluation of the following chief complaint(s): Patient presents with complaint of right hand pain described as burning. He denies numbness and tingling. The symptoms are not awakening him. He also complains of clicking and locking of the right middle finger. The symptoms have been bothering him for weeks. He denies injury/trauma. No other complaints or concerns. Hand Pain (Right )       Vitals:  Ht 5' 7\" (1.702 m)   Wt 205 lb (93 kg)   BMI 32.11 kg/m²    Body mass index is 32.11 kg/m². No Known Allergies    Current Outpatient Medications   Medication Sig    valsartan (DIOVAN) 160 mg tablet TAKE 1 TABLET BY MOUTH EVERY DAY . RETURN TO CLINIC IN 1 WEEK TO CHECK BLOOD PRESSURE ON NEW MED    rOPINIRole (REQUIP) 0.25 mg tablet TAKE 3 TABLETS BY MOUTH AT BEDTIME    meloxicam (MOBIC) 15 mg tablet Take 15 mg by mouth daily. pantoprazole (PROTONIX) 40 mg granules for oral suspension 40 mg daily. atorvastatin (LIPITOR) 40 mg tablet Take 1 Tab by mouth nightly. Blood Pressure Monitor kit 1 Units by Does Not Apply route daily. lamoTRIgine (LaMICtal) 150 mg tablet Take 150 mg by mouth two (2) times a day. losartan (COZAAR) 100 mg tablet Take 100 mg by mouth daily. sertraline (ZOLOFT) 50 mg tablet Take 50 mg by mouth daily. 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     Family History   Problem Relation Age of Onset    Heart Disease Brother         Social History     Tobacco Use    Smoking status: Former     Packs/day: 1.00     Years: 4.00     Pack years: 4.00     Types: Cigarettes     Quit date: 1974     Years since quitting: 48.2    Smokeless tobacco: Never   Substance Use Topics    Alcohol use: Yes     Comment: occasional    Drug use: No        Review of Systems    Constitutional: No fevers, chills, night sweats, excessive fatigue or weight loss. Musculoskeletal: + Right hand pain. Neurologic: No headache, blurred vision, and no areas of focal weakness or numbness. Normal gait. No sensory problems. Respiratory: No dyspnea on exertion, orthopnea, chest pain, cough or hemoptysis. Cardiovascular: No anginal chest pain, irregular heart beat, tachycardia, palpitations or orthopnea  Integumentary: No chronic rashes, inflammation, ulcerations, pruritus, petechiae, purpura, ecchymoses, or skin changes      Physical Exam    General: Alert, cooperative, no distress  Musculosketal: Right hand - Limited range of motion of the right middle finger as it is clicking and locking. Remaining digits normal range of motion. Normal sensation throughout. No erythema, edema or warmth to the joints. No cysts. Good pinch  strength. Good strength with resisted adduction. Intact pinch  strength. No thenar atrophy. Right wrist - Normal range of motion. Normal sensation. No erythema, edema or warmth to the joints. No cysts. Mildly positive Tinel's test. Negative Phalen's test.  Neurologic:  CNII-XII intact, Normal strength, sensation, and reflexes throughout    Imaging:  None today     ASSESSMENT/PLAN:  Below is the assessment and plan developed based on review of pertinent history, physical exam, labs, studies, and medications. Right hand pain described as burning. He denies numbness and tingling. The symptoms are not awakening him. He also complains of clicking and locking of the right middle finger. The symptoms have been bothering him for weeks. Clinical exam is consistent with possible right sided carpal tunnel syndrome as well as stenosing tenosynovitis of the right middle finger.   Treatment options discussed with the patient and we will obtain a right upper extremity EMG study for further evaluation of the right hand burning symptoms. He would like to undergo a therapeutic injection of the right middle finger flexor tendon sheath. Injection administered 9/29/2022. He will follow-up in the office in 3 to 4 weeks to review his EMG results and review his progress with the right middle finger. Patient consent obtained prior to the injection(s). Consent forms signed. Discussed risks/benefits of cortisone injection and patient gave verbal consent. Under sterile conditions, the right middle finger flexor tendon sheath was injected with 1 cc 0.75% Bupivacaine and 1cc 6mg betamethasone. He tolerated the procedure well. 1. Neuropathic pain of right hand  -     EMG ONE EXTREMITY UPPER RT; Future  2. Acquired trigger finger of right middle finger  -     INJECT TENDON SHEATH/LIGAMENT  -     bupivacaine (PF) (MARCAINE) 0.75 % (7.5 mg/mL) injection 7.5 mg; 7.5 mg, SubCUTAneous, ONCE, 1 dose, On Thu 9/29/22 at 1200  -     betamethasone (CELESTONE) injection 6 mg; 6 mg, Other, ONCE, 1 dose, On Thu 9/29/22 at 1200    Return in about 3 weeks (around 10/20/2022). Dr. Qiana Rivera was available for immediate consult during this encounter. An electronic signature was used to authenticate this note.   -- Panda Suarez PA-C

## 2022-09-29 ENCOUNTER — OFFICE VISIT (OUTPATIENT)
Dept: ORTHOPEDIC SURGERY | Age: 67
End: 2022-09-29
Payer: MEDICARE

## 2022-09-29 VITALS — BODY MASS INDEX: 32.18 KG/M2 | WEIGHT: 205 LBS | HEIGHT: 67 IN

## 2022-09-29 DIAGNOSIS — M79.2 NEUROPATHIC PAIN OF RIGHT HAND: Primary | ICD-10-CM

## 2022-09-29 DIAGNOSIS — M65.331 ACQUIRED TRIGGER FINGER OF RIGHT MIDDLE FINGER: ICD-10-CM

## 2022-09-29 PROCEDURE — G8417 CALC BMI ABV UP PARAM F/U: HCPCS | Performed by: PHYSICIAN ASSISTANT

## 2022-09-29 PROCEDURE — G8432 DEP SCR NOT DOC, RNG: HCPCS | Performed by: PHYSICIAN ASSISTANT

## 2022-09-29 PROCEDURE — 20550 NJX 1 TENDON SHEATH/LIGAMENT: CPT | Performed by: PHYSICIAN ASSISTANT

## 2022-09-29 PROCEDURE — 1123F ACP DISCUSS/DSCN MKR DOCD: CPT | Performed by: PHYSICIAN ASSISTANT

## 2022-09-29 PROCEDURE — 1101F PT FALLS ASSESS-DOCD LE1/YR: CPT | Performed by: PHYSICIAN ASSISTANT

## 2022-09-29 PROCEDURE — 3017F COLORECTAL CA SCREEN DOC REV: CPT | Performed by: PHYSICIAN ASSISTANT

## 2022-09-29 PROCEDURE — G8427 DOCREV CUR MEDS BY ELIG CLIN: HCPCS | Performed by: PHYSICIAN ASSISTANT

## 2022-09-29 PROCEDURE — G8536 NO DOC ELDER MAL SCRN: HCPCS | Performed by: PHYSICIAN ASSISTANT

## 2022-09-29 PROCEDURE — 99203 OFFICE O/P NEW LOW 30 MIN: CPT | Performed by: PHYSICIAN ASSISTANT

## 2022-09-29 RX ORDER — BETAMETHASONE SODIUM PHOSPHATE AND BETAMETHASONE ACETATE 3; 3 MG/ML; MG/ML
6 INJECTION, SUSPENSION INTRA-ARTICULAR; INTRALESIONAL; INTRAMUSCULAR; SOFT TISSUE ONCE
Status: COMPLETED | OUTPATIENT
Start: 2022-09-29 | End: 2022-09-29

## 2022-09-29 RX ORDER — BUPIVACAINE HYDROCHLORIDE 7.5 MG/ML
7.5 INJECTION, SOLUTION EPIDURAL; RETROBULBAR ONCE
Status: COMPLETED | OUTPATIENT
Start: 2022-09-29 | End: 2022-09-29

## 2022-09-29 RX ADMIN — BUPIVACAINE HYDROCHLORIDE 7.5 MG: 7.5 INJECTION, SOLUTION EPIDURAL; RETROBULBAR at 12:00

## 2022-09-29 RX ADMIN — BETAMETHASONE SODIUM PHOSPHATE AND BETAMETHASONE ACETATE 6 MG: 3; 3 INJECTION, SUSPENSION INTRA-ARTICULAR; INTRALESIONAL; INTRAMUSCULAR; SOFT TISSUE at 12:00

## 2022-12-23 DIAGNOSIS — G25.81 RESTLESS LEG SYNDROME: ICD-10-CM

## 2022-12-26 RX ORDER — ROPINIROLE 0.25 MG/1
TABLET, FILM COATED ORAL
Qty: 270 TABLET | Refills: 1 | Status: SHIPPED | OUTPATIENT
Start: 2022-12-26

## 2023-03-06 ENCOUNTER — OFFICE VISIT (OUTPATIENT)
Dept: NEUROLOGY | Age: 68
End: 2023-03-06
Payer: MEDICARE

## 2023-03-06 VITALS
WEIGHT: 209 LBS | HEART RATE: 81 BPM | OXYGEN SATURATION: 98 % | BODY MASS INDEX: 32.73 KG/M2 | SYSTOLIC BLOOD PRESSURE: 128 MMHG | DIASTOLIC BLOOD PRESSURE: 76 MMHG

## 2023-03-06 DIAGNOSIS — G47.61 PERIODIC LIMB MOVEMENT: ICD-10-CM

## 2023-03-06 DIAGNOSIS — G25.81 RESTLESS LEG SYNDROME: Primary | ICD-10-CM

## 2023-03-06 DIAGNOSIS — M47.812 OSTEOARTHRITIS OF CERVICAL SPINE, UNSPECIFIED SPINAL OSTEOARTHRITIS COMPLICATION STATUS: ICD-10-CM

## 2023-03-06 DIAGNOSIS — R90.82 WHITE MATTER DISEASE: ICD-10-CM

## 2023-03-06 PROCEDURE — 1101F PT FALLS ASSESS-DOCD LE1/YR: CPT | Performed by: PSYCHIATRY & NEUROLOGY

## 2023-03-06 PROCEDURE — 3017F COLORECTAL CA SCREEN DOC REV: CPT | Performed by: PSYCHIATRY & NEUROLOGY

## 2023-03-06 PROCEDURE — G8536 NO DOC ELDER MAL SCRN: HCPCS | Performed by: PSYCHIATRY & NEUROLOGY

## 2023-03-06 PROCEDURE — 99214 OFFICE O/P EST MOD 30 MIN: CPT | Performed by: PSYCHIATRY & NEUROLOGY

## 2023-03-06 PROCEDURE — 1123F ACP DISCUSS/DSCN MKR DOCD: CPT | Performed by: PSYCHIATRY & NEUROLOGY

## 2023-03-06 PROCEDURE — G8510 SCR DEP NEG, NO PLAN REQD: HCPCS | Performed by: PSYCHIATRY & NEUROLOGY

## 2023-03-06 PROCEDURE — G8427 DOCREV CUR MEDS BY ELIG CLIN: HCPCS | Performed by: PSYCHIATRY & NEUROLOGY

## 2023-03-06 PROCEDURE — G8417 CALC BMI ABV UP PARAM F/U: HCPCS | Performed by: PSYCHIATRY & NEUROLOGY

## 2023-03-06 RX ORDER — ROPINIROLE 1 MG/1
1 TABLET, FILM COATED ORAL EVERY EVENING
Qty: 90 TABLET | Refills: 1 | Status: SHIPPED | OUTPATIENT
Start: 2023-03-06

## 2023-03-06 NOTE — PROGRESS NOTES
Chief Complaint   Patient presents with    Other     Restless leg syndrome  Pt reports getting worse          HISTORY OF PRESENT ILLNESS  Zan Rodgers came back for follow-up. He was last seen about a year and a half ago. He states that his restless leg symptoms have gotten worse. It is quite difficult for him to get a restful night and prolonged sitting, car rides, plane rides etc. 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 He is doing quite well and does not have any residual deficits. Occasionally, if he is tired he will start to grab his right leg. He was noted to have mild parkinsonian features including mild rigidity in the right upper extremity, reduced arm swing on the right and overall reduced mobility. This was suspected to be due to basal ganglia stroke. RECAP  He was seen in the hospital in December 2019 when he was admitted with right sided numbness and weakness. He was found to have left sided basal ganglia bleed, related to poorly controlled blood pressure. He has recovered quite well from his deficits and feels fairly back to baseline. His blood pressure is much better controlled. 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 He was advised not to drive at the time of discharge but feels fairly comfortable that he can drive. Denies any cognitive issues, speech problems, language fluency or mood changes. Wife has noticed that he has been more chill type of a person since the stroke whereas he was somewhat of a hyper person. She also thinks it could be because he is not taking Adderall anymore.      He has known about extensive white matter change in his brain for the past 15 years or so and used to follow-up with a different neurologist in this regard. His most recent imaging revealed the same. [facility]. Current Outpatient Medications   Medication Sig    rOPINIRole (REQUIP) 1 mg tablet Take 1 Tablet by mouth every evening. valsartan (DIOVAN) 160 mg tablet TAKE 1 TABLET BY MOUTH EVERY DAY . RETURN TO CLINIC IN 1 WEEK TO CHECK BLOOD PRESSURE ON NEW MED    pantoprazole (PROTONIX) 40 mg granules for oral suspension 40 mg daily. atorvastatin (LIPITOR) 40 mg tablet Take 1 Tab by mouth nightly. Blood Pressure Monitor kit 1 Units by Does Not Apply route daily. lamoTRIgine (LaMICtal) 150 mg tablet Take 150 mg by mouth two (2) times a day. sertraline (ZOLOFT) 50 mg tablet Take 50 mg by mouth daily. meloxicam (MOBIC) 15 mg tablet Take 15 mg by mouth daily. (Patient not taking: Reported on 3/6/2023)    losartan (COZAAR) 100 mg tablet Take 100 mg by mouth daily. (Patient not taking: Reported on 3/6/2023)     No current facility-administered medications for this visit. PHYSICAL EXAMINATION:    Visit Vitals  /76   Pulse 81   Wt 209 lb (94.8 kg)   SpO2 98%   BMI 32.73 kg/m²       NEUROLOGICAL EXAMINATION:     Mental Status:   Alert and oriented to person, place, and time. Attention span and concentration are normal. Speech is fluent. Cranial Nerves:    II, III, IV, VI:  Visual acuity grossly intact. Visual fields are normal.    Pupils are equal, round, and reactive to light and accommodation. Extra-ocular movements are full and fluid. V-XII: Hearing is grossly intact. Facial features are symmetric, with normal sensation and strength. The palate rises symmetrically and the tongue protrudes midline. Sternocleidomastoids 5/5. Motor Examination: Normal tone, bulk, and strength. 5/5 muscle strength throughout. Minimal rigidity is noted in the right upper extremity with provocation.       Sensory exam:  Normal throughout to pinprick, temperature, and vibration sense. Normal proprioception. Coordination:  Finger to nose and rapid arm movement testing was normal.   No resting or intention tremor    Gait and Station:  Steady. Mildly reduced arm swing on the right. No Rhomberg or pronator drift. No muscle wasting or fasiculations noted. Reflexes:  DTRs 2+ throughout. Toes downgoing. LABS / IMAGING  Lab Results   Component Value Date/Time    WBC 7.3 12/02/2019 02:51 AM    HGB 15.0 12/02/2019 02:51 AM    HCT 43.2 12/02/2019 02:51 AM    PLATELET 255 76/61/5783 02:51 AM    MCV 97.1 12/02/2019 02:51 AM     Lab Results   Component Value Date/Time    Sodium 136 12/02/2019 04:43 AM    Potassium 4.5 12/02/2019 04:43 AM    Chloride 105 12/02/2019 04:43 AM    CO2 25 12/02/2019 04:43 AM    Anion gap 6 12/02/2019 04:43 AM    Glucose 102 (H) 12/02/2019 04:43 AM    BUN 22 (H) 12/02/2019 04:43 AM    Creatinine 1.05 12/02/2019 04:43 AM    BUN/Creatinine ratio 21 (H) 12/02/2019 04:43 AM    GFR est AA >60 12/02/2019 04:43 AM    GFR est non-AA >60 12/02/2019 04:43 AM    Calcium 9.0 12/02/2019 04:43 AM    Bilirubin, total 0.3 12/02/2019 02:51 AM    Alk. phosphatase 84 12/02/2019 02:51 AM    Protein, total 7.2 12/02/2019 02:51 AM    Albumin 3.8 12/02/2019 02:51 AM    Globulin 3.4 12/02/2019 02:51 AM    A-G Ratio 1.1 12/02/2019 02:51 AM    ALT (SGPT) 27 12/02/2019 02:51 AM    AST (SGOT) 13 (L) 12/02/2019 02:51 AM         ASSESSMENT    ICD-10-CM ICD-9-CM    1. Restless leg syndrome  G25.81 333.94 rOPINIRole (REQUIP) 1 mg tablet      2. White matter disease  R90.82 348.89       3. Periodic limb movement  G47.61 327.51       4.  Osteoarthritis of cervical spine, unspecified spinal osteoarthritis complication status  C73.864 721.0           DISCUSSION  Mr. Cammy Min who had a hypertensive left basal ganglia bleed in Dec 2019  Fortunately he has not had any residual deficits  Extensive white matter disease has been noted on his brain imaging this has been present on a chronic basis  He also has restless leg syndrome and mild parkinsonism was noted possibly related to underlying basal ganglia stroke    Ropinirole was helping but not anymore. I have recommended increasing the dose to 1 mg every evening. The dose can be increased to 1.5 mg after 1 week and then 2 mg after another week if needed.   We discussed that we could use it up to 4 mg nightly and if its not effective at that dose, we will need to consider an add-on medication or an alternative  Continue with home exercise program and try to do stretching and strengthening of core lower back musculature  He also has periodic limb movements of sleep and has had sleep study in the past which did not show any obstructive sleep apnea    Continue aspirin 81 mg daily  Follow-up annually or earlier if needed    Lazaro Zelaya MD  Diplomate, American Board of Psychiatry & Neurology (Neurology)  [place] Board of Psychiatry & Neurology (Clinical Neurophysiology)  Diplomate, American Board of Electrodiagnostic Medicine

## 2023-06-13 ENCOUNTER — TELEPHONE (OUTPATIENT)
Age: 68
End: 2023-06-13

## 2023-06-13 NOTE — TELEPHONE ENCOUNTER
Patient requesting dosage increase of (currently 1 mg) . Ropinirole  bc 1 mg. Is not working. He is not sleeping, restless leg and he has to walk around bc of restless leg movement.      Pls call & advise  [phone]

## 2023-12-02 DIAGNOSIS — G25.81 RESTLESS LEGS SYNDROME: ICD-10-CM

## 2023-12-04 RX ORDER — ROPINIROLE 2 MG/1
2 TABLET, FILM COATED ORAL NIGHTLY
Qty: 90 TABLET | Refills: 1 | Status: SHIPPED | OUTPATIENT
Start: 2023-12-04

## 2023-12-04 RX ORDER — ROPINIROLE 1 MG/1
1 TABLET, FILM COATED ORAL EVERY EVENING
Qty: 90 TABLET | Refills: 1 | Status: SHIPPED | OUTPATIENT
Start: 2023-12-04

## 2024-03-08 ENCOUNTER — OFFICE VISIT (OUTPATIENT)
Age: 69
End: 2024-03-08

## 2024-03-08 VITALS
HEART RATE: 94 BPM | OXYGEN SATURATION: 96 % | DIASTOLIC BLOOD PRESSURE: 88 MMHG | BODY MASS INDEX: 29.55 KG/M2 | HEIGHT: 68 IN | SYSTOLIC BLOOD PRESSURE: 136 MMHG | WEIGHT: 195 LBS

## 2024-03-08 DIAGNOSIS — G47.61 PERIODIC LIMB MOVEMENT DISORDER: ICD-10-CM

## 2024-03-08 DIAGNOSIS — M54.31 SCIATICA OF RIGHT SIDE: ICD-10-CM

## 2024-03-08 DIAGNOSIS — G25.81 RESTLESS LEGS SYNDROME: Primary | ICD-10-CM

## 2024-03-08 DIAGNOSIS — R90.82 WHITE MATTER DISEASE, UNSPECIFIED: ICD-10-CM

## 2024-03-08 RX ORDER — KETOROLAC TROMETHAMINE 10 MG/1
10 TABLET, FILM COATED ORAL 2 TIMES DAILY
COMMUNITY
Start: 2024-03-02 | End: 2024-03-08

## 2024-03-08 RX ORDER — CYCLOBENZAPRINE HCL 10 MG
10 TABLET ORAL 2 TIMES DAILY
COMMUNITY
Start: 2024-03-02 | End: 2024-03-08

## 2024-03-08 RX ORDER — METHOCARBAMOL 500 MG/1
TABLET, FILM COATED ORAL
COMMUNITY
Start: 2024-03-06

## 2024-03-08 RX ORDER — GABAPENTIN 100 MG/1
CAPSULE ORAL
Qty: 90 CAPSULE | Refills: 1 | Status: SHIPPED | OUTPATIENT
Start: 2024-03-08 | End: 2024-04-08

## 2024-03-08 NOTE — PROGRESS NOTES
Chief Complaint   Patient presents with    Follow-up     RLS, Hx of stroke     Vitals:    03/08/24 0955   BP: 136/88   Pulse: 94   SpO2: 96%

## 2024-03-08 NOTE — PROGRESS NOTES
Chief Complaint   Patient presents with    Follow-up     RLS, Hx of stroke     HISTORY OF PRESENT ILLNESS  Chris Junior came back for follow-up. He was last seen about a year ago for restless leg syndrome.  Currently taking 2 mg of ropinirole and it does not seem to be effective anymore  It is quite difficult for him to get a restful night and prolonged sitting, car rides, plane rides etc. have become very difficult.  He is getting the discomfort during the day as well now.  He also gets intermittent leg movements when he is sleeping.    A new problem he reports is low back pain that has been radiating down to his right leg.  This is suspected to be related to degenerative changes in his lumbar spine.  He did get an x-ray of the hip which was unremarkable.  He is scheduled to see a spine doctor at the end of this month.    He had left-sided basal ganglia bleed in December 2019.  He is doing quite well and does not have any residual deficits.  Occasionally, if he is tired he will start to grab his right leg.      He was noted to have mild parkinsonian features including mild rigidity in the right upper extremity, reduced arm swing on the right and overall reduced mobility.  This was suspected to be due to basal ganglia stroke.        RECAP  He was seen in the hospital in December 2019 when he was admitted with right sided numbness and weakness.  He was found to have left sided basal ganglia bleed, related to poorly controlled blood pressure.  He has recovered quite well from his deficits and feels fairly back to baseline.  His blood pressure is much better controlled.  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  He was advised not to drive at the time of discharge but feels fairly comfortable that he can drive.   Denies any cognitive issues, speech problems, language fluency or mood changes.  Wife has noticed that he has been more chill type of a person since the stroke whereas he was

## 2024-03-14 ENCOUNTER — TELEPHONE (OUTPATIENT)
Age: 69
End: 2024-03-14

## 2024-03-15 DIAGNOSIS — M54.31 SCIATICA OF RIGHT SIDE: Primary | ICD-10-CM

## 2024-04-09 DIAGNOSIS — G25.81 RESTLESS LEGS SYNDROME: ICD-10-CM

## 2024-04-09 DIAGNOSIS — M54.31 SCIATICA OF RIGHT SIDE: ICD-10-CM

## 2024-04-09 RX ORDER — GABAPENTIN 100 MG/1
CAPSULE ORAL
Qty: 90 CAPSULE | Refills: 1 | Status: SHIPPED | OUTPATIENT
Start: 2024-04-09 | End: 2024-05-10

## 2024-04-09 NOTE — TELEPHONE ENCOUNTER
Called patient. No answer. Requip should be out by June. As for gabapentin, he should be out by 05/08 but will put in a new script for his gabapentin for the next fill.     LOV: 03/08/24  Last refill: 03/08/24 with 1 refill as a 30 day supply  Next visit: 03/14/25

## 2024-04-09 NOTE — TELEPHONE ENCOUNTER
Patient is calling to request a refill of his rOPINIRole and gabapentin to be sent to the [facility] on file.

## 2024-04-12 NOTE — TELEPHONE ENCOUNTER
Patient returning nurse call concerning medication rOPINIRole (REQUIP) 1 MG tablet . Please contact

## 2024-10-07 RX ORDER — ROPINIROLE 2 MG/1
2 TABLET, FILM COATED ORAL NIGHTLY
Qty: 90 TABLET | Refills: 1 | Status: SHIPPED | OUTPATIENT
Start: 2024-10-07

## 2024-10-26 DIAGNOSIS — G25.81 RESTLESS LEGS SYNDROME: ICD-10-CM

## 2024-10-28 RX ORDER — ROPINIROLE 1 MG/1
1 TABLET, FILM COATED ORAL EVERY EVENING
Qty: 90 TABLET | Refills: 1 | OUTPATIENT
Start: 2024-10-28

## 2024-11-27 ENCOUNTER — TELEPHONE (OUTPATIENT)
Age: 69
End: 2024-11-27

## 2024-11-27 DIAGNOSIS — M54.31 SCIATICA OF RIGHT SIDE: ICD-10-CM

## 2024-11-27 DIAGNOSIS — G25.81 RESTLESS LEGS SYNDROME: ICD-10-CM

## 2024-11-27 RX ORDER — GABAPENTIN 100 MG/1
CAPSULE ORAL
Qty: 90 CAPSULE | Refills: 1 | Status: SHIPPED | OUTPATIENT
Start: 2024-11-27 | End: 2024-12-28

## 2025-01-27 RX ORDER — ROPINIROLE 2 MG/1
2 TABLET, FILM COATED ORAL NIGHTLY
Qty: 90 TABLET | Refills: 1 | OUTPATIENT
Start: 2025-01-27

## 2025-03-14 ENCOUNTER — OFFICE VISIT (OUTPATIENT)
Age: 70
End: 2025-03-14
Payer: MEDICARE

## 2025-03-14 VITALS
BODY MASS INDEX: 30.31 KG/M2 | HEART RATE: 76 BPM | HEIGHT: 68 IN | WEIGHT: 200 LBS | SYSTOLIC BLOOD PRESSURE: 128 MMHG | OXYGEN SATURATION: 96 % | DIASTOLIC BLOOD PRESSURE: 86 MMHG

## 2025-03-14 DIAGNOSIS — M54.31 SCIATICA OF RIGHT SIDE: ICD-10-CM

## 2025-03-14 DIAGNOSIS — G25.81 RESTLESS LEGS SYNDROME: Primary | ICD-10-CM

## 2025-03-14 DIAGNOSIS — R90.82 WHITE MATTER DISEASE, UNSPECIFIED: ICD-10-CM

## 2025-03-14 DIAGNOSIS — F32.89 OTHER DEPRESSION: ICD-10-CM

## 2025-03-14 PROCEDURE — 1159F MED LIST DOCD IN RCRD: CPT | Performed by: PSYCHIATRY & NEUROLOGY

## 2025-03-14 PROCEDURE — 99214 OFFICE O/P EST MOD 30 MIN: CPT | Performed by: PSYCHIATRY & NEUROLOGY

## 2025-03-14 PROCEDURE — 1123F ACP DISCUSS/DSCN MKR DOCD: CPT | Performed by: PSYCHIATRY & NEUROLOGY

## 2025-03-14 RX ORDER — VALSARTAN 160 MG/1
160 TABLET ORAL DAILY
COMMUNITY
Start: 2024-12-26

## 2025-03-14 RX ORDER — GABAPENTIN 100 MG/1
CAPSULE ORAL
Qty: 90 CAPSULE | Refills: 1 | Status: SHIPPED | OUTPATIENT
Start: 2025-03-14 | End: 2025-04-14

## 2025-03-14 RX ORDER — BUPROPION HYDROCHLORIDE 150 MG/1
150 TABLET ORAL EVERY MORNING
Qty: 90 TABLET | Refills: 0 | Status: SHIPPED | OUTPATIENT
Start: 2025-03-14

## 2025-03-14 NOTE — PATIENT INSTRUCTIONS
[facility]iatry [facility]  Address: [address] [place] [address]  Phone: [phone]    [facility] [place]   Address: [address], [place] [address]  Phone: [phone]    [facility] (In person/virtual)   [place] Location  Address: [address], [place] [address]  Phone: [phone]    [place] Location  Address: [address] [place] [address]  Phone: [phone]    [place] Location  Address: [address], [place] [address]  Phone: [phone]    [place] Location  Address: [address], [place] [address]  Phone: [phone]    [place] Location  Address: [address], [place] [address]  Phone: [phone]    [facility]  Address: [address], [place] [address]  Phone: [phone]    [facility]  [place]  [address] [place]  Phone: [phone]    [place]  [address]  [place], [place]  Phone: [phone]    [facility]  Address: [address] [place] [address]  Phone: [phone]    [facility]  Address: [address], [place] [address]  Phone: [phone]    [facility]  Address: [address], [place] [address]  Phone: [phone]    [facility], [facility]  Address: [address] [address]  Phone: [phone]    [facility], [facility]  Address: [address] [place] [address]  Phone: [phone]

## 2025-03-14 NOTE — PROGRESS NOTES
Chief Complaint   Patient presents with    Neurologic Problem     RLS     Vitals:    03/14/25 0946   BP: 128/86   Pulse: 76   SpO2: 96%       
strength throughout.  Minimal rigidity is noted in the right upper extremity with provocation.      Sensory exam:  Normal throughout to pinprick, temperature, and vibration sense.  Normal proprioception.      Coordination:  Finger to nose and rapid arm movement testing was normal.   No resting or intention tremor    Gait and Station:  Steady.  Mildly reduced arm swing on the right.  No Rhomberg or pronator drift.   No muscle wasting or fasiculations noted.      Reflexes:  DTRs 2+ throughout.  Toes downgoing.        LABS / IMAGING    Lab Results   Component Value Date    WBC 7.3 12/02/2019    HGB 15.0 12/02/2019    HCT 43.2 12/02/2019    MCV 97.1 12/02/2019     12/02/2019     Lab Results   Component Value Date     12/02/2019    K 4.5 12/02/2019     12/02/2019    CO2 25 12/02/2019    BUN 22 (H) 12/02/2019    CREATININE 1.05 12/02/2019    GLUCOSE 102 (H) 12/02/2019    CALCIUM 9.0 12/02/2019    BILITOT 0.3 12/02/2019    ALKPHOS 84 12/02/2019    AST 13 (L) 12/02/2019    ALT 27 12/02/2019    GFRAA >60 12/02/2019    AGRATIO 1.1 12/02/2019    GLOB 3.4 12/02/2019       ASSESSMENT   Diagnosis Orders   1. Restless legs syndrome  buPROPion (WELLBUTRIN XL) 150 MG extended release tablet    gabapentin (NEURONTIN) 100 MG capsule      2. Other depression  buPROPion (WELLBUTRIN XL) 150 MG extended release tablet    External Referral To Psychiatry      3. Sciatica of right side  gabapentin (NEURONTIN) 100 MG capsule      4. White matter disease, unspecified          DISCUSSION  Mr. Chris Junior who had a hypertensive left basal ganglia bleed in Dec 2019  Fortunately he has not had any residual deficits  Extensive white matter disease has been noted on his brain imaging this has been present on a chronic basis  He also has restless leg syndrome and mild parkinsonism was noted at a previous visit but not noted today.  This was suspected to be due to basal ganglia stroke     He is currently on ropinirole 2 mg and

## 2025-06-13 DIAGNOSIS — F32.89 OTHER DEPRESSION: ICD-10-CM

## 2025-06-13 DIAGNOSIS — G25.81 RESTLESS LEGS SYNDROME: ICD-10-CM

## 2025-06-13 RX ORDER — BUPROPION HYDROCHLORIDE 150 MG/1
150 TABLET ORAL EVERY MORNING
Qty: 90 TABLET | Refills: 0 | Status: SHIPPED | OUTPATIENT
Start: 2025-06-13

## 2025-07-02 DIAGNOSIS — G25.81 RESTLESS LEGS SYNDROME: ICD-10-CM

## 2025-07-02 DIAGNOSIS — F32.89 OTHER DEPRESSION: ICD-10-CM

## 2025-07-03 RX ORDER — BUPROPION HYDROCHLORIDE 150 MG/1
150 TABLET ORAL EVERY MORNING
Qty: 90 TABLET | Refills: 0 | OUTPATIENT
Start: 2025-07-03

## 2025-07-03 RX ORDER — ROPINIROLE 1 MG/1
1 TABLET, FILM COATED ORAL EVERY EVENING
Qty: 90 TABLET | Refills: 1 | OUTPATIENT
Start: 2025-07-03

## 2025-07-03 RX ORDER — ROPINIROLE 2 MG/1
2 TABLET, FILM COATED ORAL NIGHTLY
Qty: 90 TABLET | Refills: 1 | Status: SHIPPED | OUTPATIENT
Start: 2025-07-03

## 2025-08-07 ENCOUNTER — TELEPHONE (OUTPATIENT)
Age: 70
End: 2025-08-07

## 2025-08-08 DIAGNOSIS — M54.31 SCIATICA OF RIGHT SIDE: ICD-10-CM

## 2025-08-08 DIAGNOSIS — G25.81 RESTLESS LEGS SYNDROME: ICD-10-CM

## 2025-08-08 RX ORDER — GABAPENTIN 100 MG/1
CAPSULE ORAL
Qty: 90 CAPSULE | Refills: 1 | Status: SHIPPED | OUTPATIENT
Start: 2025-08-08 | End: 2025-09-08